# Patient Record
Sex: MALE | Race: WHITE | Employment: UNEMPLOYED | URBAN - METROPOLITAN AREA
[De-identification: names, ages, dates, MRNs, and addresses within clinical notes are randomized per-mention and may not be internally consistent; named-entity substitution may affect disease eponyms.]

---

## 2018-04-12 ENCOUNTER — HOSPITAL ENCOUNTER (OUTPATIENT)
Age: 48
Discharge: HOME | End: 2018-04-12
Payer: MEDICARE

## 2018-04-12 LAB
ALBUMIN SERPL-MCNC: 4.3 G/DL (ref 3.5–5.2)
ALP BLD-CCNC: 56 U/L (ref 40–129)
ALT SERPL-CCNC: 29 U/L (ref 0–40)
ANION GAP SERPL CALCULATED.3IONS-SCNC: 13 MMOL/L (ref 7–16)
AST SERPL-CCNC: 25 U/L (ref 0–39)
BACTERIA: ABNORMAL /HPF
BILIRUB SERPL-MCNC: 0.9 MG/DL (ref 0–1.2)
BILIRUBIN URINE: NEGATIVE
BLOOD, URINE: ABNORMAL
BUN BLDV-MCNC: 13 MG/DL (ref 6–20)
CALCIUM SERPL-MCNC: 9.4 MG/DL (ref 8.6–10.2)
CHLORIDE BLD-SCNC: 102 MMOL/L (ref 98–107)
CHOLESTEROL, TOTAL: 197 MG/DL (ref 0–199)
CLARITY: CLEAR
CO2: 25 MMOL/L (ref 22–29)
COLOR: YELLOW
CREAT SERPL-MCNC: 0.9 MG/DL (ref 0.7–1.2)
GFR AFRICAN AMERICAN: >60
GFR NON-AFRICAN AMERICAN: >60 ML/MIN/1.73
GLUCOSE BLD-MCNC: 106 MG/DL (ref 74–109)
GLUCOSE URINE: NEGATIVE MG/DL
HDLC SERPL-MCNC: 55 MG/DL
KETONES, URINE: NEGATIVE MG/DL
LDL CHOLESTEROL CALCULATED: 122 MG/DL (ref 0–99)
LEUKOCYTE ESTERASE, URINE: NEGATIVE
NITRITE, URINE: NEGATIVE
PH UA: 6 (ref 5–9)
POTASSIUM SERPL-SCNC: 3.9 MMOL/L (ref 3.5–5)
PROTEIN UA: NEGATIVE MG/DL
RBC UA: ABNORMAL /HPF (ref 0–2)
SODIUM BLD-SCNC: 140 MMOL/L (ref 132–146)
SPECIFIC GRAVITY UA: 1.02 (ref 1–1.03)
TOTAL PROTEIN: 7.7 G/DL (ref 6.4–8.3)
TRIGL SERPL-MCNC: 99 MG/DL (ref 0–149)
TSH SERPL DL<=0.05 MIU/L-ACNC: 2.06 UIU/ML (ref 0.27–4.2)
URIC ACID, SERUM: 6.4 MG/DL (ref 3.4–7)
UROBILINOGEN, URINE: 0.2 E.U./DL
VITAMIN D 25-HYDROXY: 31 NG/ML (ref 30–100)
VLDLC SERPL CALC-MCNC: 20 MG/DL
WBC UA: ABNORMAL /HPF (ref 0–5)

## 2018-04-12 PROCEDURE — 81001 URINALYSIS AUTO W/SCOPE: CPT

## 2018-04-12 PROCEDURE — 84443 ASSAY THYROID STIM HORMONE: CPT

## 2018-04-12 PROCEDURE — 82306 VITAMIN D 25 HYDROXY: CPT

## 2018-04-12 PROCEDURE — 80053 COMPREHEN METABOLIC PANEL: CPT

## 2018-04-12 PROCEDURE — 84550 ASSAY OF BLOOD/URIC ACID: CPT

## 2018-04-12 PROCEDURE — 36415 COLL VENOUS BLD VENIPUNCTURE: CPT

## 2018-04-12 PROCEDURE — 80061 LIPID PANEL: CPT

## 2018-04-22 PROBLEM — E78.3 HYPERCHYLOMICRONEMIA: Status: ACTIVE | Noted: 2018-04-22

## 2018-04-22 PROBLEM — R39.89 ABNORMAL PROSTATE EXAM: Status: ACTIVE | Noted: 2018-04-22

## 2018-04-22 PROBLEM — R53.83 FATIGUE: Status: ACTIVE | Noted: 2018-04-22

## 2018-04-22 PROBLEM — E03.9 ACQUIRED HYPOTHYROIDISM: Status: ACTIVE | Noted: 2018-04-22

## 2018-04-22 PROBLEM — M89.9 DISORDER OF BONE: Status: ACTIVE | Noted: 2018-04-22

## 2018-04-24 ENCOUNTER — OFFICE VISIT (OUTPATIENT)
Dept: PRIMARY CARE CLINIC | Age: 48
End: 2018-04-24
Payer: MEDICARE

## 2018-04-24 VITALS
HEART RATE: 63 BPM | HEIGHT: 69 IN | WEIGHT: 221 LBS | TEMPERATURE: 98.2 F | BODY MASS INDEX: 32.73 KG/M2 | SYSTOLIC BLOOD PRESSURE: 120 MMHG | DIASTOLIC BLOOD PRESSURE: 82 MMHG | OXYGEN SATURATION: 97 % | RESPIRATION RATE: 18 BRPM

## 2018-04-24 DIAGNOSIS — N42.89 PROSTATE ASYMMETRY: ICD-10-CM

## 2018-04-24 DIAGNOSIS — K21.00 REFLUX ESOPHAGITIS: ICD-10-CM

## 2018-04-24 DIAGNOSIS — E78.3 HYPERCHYLOMICRONEMIA: ICD-10-CM

## 2018-04-24 DIAGNOSIS — E78.2 MIXED HYPERLIPIDEMIA: ICD-10-CM

## 2018-04-24 DIAGNOSIS — L40.9 PSORIASIS: ICD-10-CM

## 2018-04-24 DIAGNOSIS — R73.09 ELEVATED HEMOGLOBIN A1C: ICD-10-CM

## 2018-04-24 DIAGNOSIS — I89.0 LYMPHEDEMA OF RIGHT LOWER EXTREMITY: ICD-10-CM

## 2018-04-24 DIAGNOSIS — E03.9 ACQUIRED HYPOTHYROIDISM: Primary | ICD-10-CM

## 2018-04-24 DIAGNOSIS — R53.83 FATIGUE, UNSPECIFIED TYPE: ICD-10-CM

## 2018-04-24 PROCEDURE — 99213 OFFICE O/P EST LOW 20 MIN: CPT | Performed by: INTERNAL MEDICINE

## 2018-04-24 RX ORDER — ATORVASTATIN CALCIUM 20 MG/1
20 TABLET, FILM COATED ORAL DAILY
COMMUNITY
End: 2018-04-24 | Stop reason: SDUPTHER

## 2018-04-24 RX ORDER — ATORVASTATIN CALCIUM 20 MG/1
20 TABLET, FILM COATED ORAL DAILY
Qty: 30 TABLET | Refills: 5 | Status: SHIPPED | OUTPATIENT
Start: 2018-04-24

## 2018-04-24 RX ORDER — LEVOTHYROXINE SODIUM 0.03 MG/1
25 TABLET ORAL DAILY
Qty: 30 TABLET | Refills: 5 | Status: SHIPPED | OUTPATIENT
Start: 2018-04-24

## 2018-04-24 RX ORDER — ACETAMINOPHEN 160 MG
1 TABLET,DISINTEGRATING ORAL
COMMUNITY

## 2018-04-24 ASSESSMENT — PATIENT HEALTH QUESTIONNAIRE - PHQ9
1. LITTLE INTEREST OR PLEASURE IN DOING THINGS: 0
2. FEELING DOWN, DEPRESSED OR HOPELESS: 0
SUM OF ALL RESPONSES TO PHQ QUESTIONS 1-9: 0
SUM OF ALL RESPONSES TO PHQ9 QUESTIONS 1 & 2: 0

## 2018-04-24 ASSESSMENT — ENCOUNTER SYMPTOMS
DIARRHEA: 0
EYE REDNESS: 0
BLOOD IN STOOL: 0
EYE PAIN: 0
BLURRED VISION: 0
SHORTNESS OF BREATH: 0
NAUSEA: 0
HEMOPTYSIS: 0
STRIDOR: 0
DOUBLE VISION: 0

## 2018-08-10 ENCOUNTER — HOSPITAL ENCOUNTER (EMERGENCY)
Age: 48
Discharge: HOME | End: 2018-08-10
Attending: EMERGENCY MEDICINE
Payer: MEDICARE

## 2018-08-10 ENCOUNTER — APPOINTMENT (OUTPATIENT)
Dept: GENERAL RADIOLOGY | Age: 48
End: 2018-08-10
Payer: MEDICARE

## 2018-08-10 ENCOUNTER — TELEPHONE (OUTPATIENT)
Dept: ORTHOPEDIC SURGERY | Age: 48
End: 2018-08-10

## 2018-08-10 VITALS
SYSTOLIC BLOOD PRESSURE: 169 MMHG | OXYGEN SATURATION: 98 % | RESPIRATION RATE: 18 BRPM | TEMPERATURE: 98.2 F | BODY MASS INDEX: 33.33 KG/M2 | WEIGHT: 225 LBS | HEART RATE: 78 BPM | HEIGHT: 69 IN | DIASTOLIC BLOOD PRESSURE: 91 MMHG

## 2018-08-10 DIAGNOSIS — S76.109A INJURY OF QUADRICEPS TENDON: Primary | ICD-10-CM

## 2018-08-10 PROCEDURE — 73564 X-RAY EXAM KNEE 4 OR MORE: CPT

## 2018-08-10 PROCEDURE — 99283 EMERGENCY DEPT VISIT LOW MDM: CPT

## 2018-08-10 RX ORDER — OXYCODONE HYDROCHLORIDE AND ACETAMINOPHEN 5; 325 MG/1; MG/1
1-2 TABLET ORAL EVERY 6 HOURS PRN
Qty: 20 TABLET | Refills: 0 | Status: ON HOLD | OUTPATIENT
Start: 2018-08-10 | End: 2018-08-17 | Stop reason: HOSPADM

## 2018-08-10 ASSESSMENT — PAIN DESCRIPTION - DESCRIPTORS: DESCRIPTORS: SORE

## 2018-08-10 ASSESSMENT — PAIN SCALES - GENERAL: PAINLEVEL_OUTOF10: 5

## 2018-08-10 ASSESSMENT — PAIN DESCRIPTION - LOCATION: LOCATION: LEG;KNEE

## 2018-08-10 ASSESSMENT — PAIN DESCRIPTION - PAIN TYPE: TYPE: ACUTE PAIN

## 2018-08-10 ASSESSMENT — PAIN DESCRIPTION - ORIENTATION: ORIENTATION: LEFT
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
## 2018-08-10 NOTE — ED NOTES
Bed: 08  Expected date:   Expected time:   Means of arrival:   Comments:  ems     Anika Hedrick RN  08/10/18 4821

## 2018-08-13 ENCOUNTER — OFFICE VISIT (OUTPATIENT)
Dept: ORTHOPEDIC SURGERY | Age: 48
End: 2018-08-13
Payer: MEDICARE

## 2018-08-13 VITALS
HEART RATE: 86 BPM | BODY MASS INDEX: 32.58 KG/M2 | DIASTOLIC BLOOD PRESSURE: 89 MMHG | SYSTOLIC BLOOD PRESSURE: 139 MMHG | RESPIRATION RATE: 17 BRPM | WEIGHT: 220 LBS | HEIGHT: 69 IN | TEMPERATURE: 97.4 F

## 2018-08-13 DIAGNOSIS — S76.112D RUPTURE OF LEFT QUADRICEPS TENDON, SUBSEQUENT ENCOUNTER: Primary | ICD-10-CM

## 2018-08-13 PROCEDURE — 99203 OFFICE O/P NEW LOW 30 MIN: CPT | Performed by: PHYSICIAN ASSISTANT

## 2018-08-13 PROCEDURE — 99024 POSTOP FOLLOW-UP VISIT: CPT | Performed by: PHYSICIAN ASSISTANT

## 2018-08-14 ENCOUNTER — PREP FOR PROCEDURE (OUTPATIENT)
Dept: ORTHOPEDIC SURGERY | Age: 48
End: 2018-08-14

## 2018-08-14 RX ORDER — SODIUM CHLORIDE, SODIUM LACTATE, POTASSIUM CHLORIDE, CALCIUM CHLORIDE 600; 310; 30; 20 MG/100ML; MG/100ML; MG/100ML; MG/100ML
INJECTION, SOLUTION INTRAVENOUS CONTINUOUS
Status: CANCELLED | OUTPATIENT
Start: 2018-08-14 | End: 2019-08-14

## 2018-08-14 RX ORDER — SODIUM CHLORIDE 0.9 % (FLUSH) 0.9 %
10 SYRINGE (ML) INJECTION PRN
Status: CANCELLED | OUTPATIENT
Start: 2018-08-14 | End: 2019-08-14

## 2018-08-14 RX ORDER — SODIUM CHLORIDE 0.9 % (FLUSH) 0.9 %
10 SYRINGE (ML) INJECTION EVERY 12 HOURS SCHEDULED
Status: CANCELLED | OUTPATIENT
Start: 2018-08-14 | End: 2019-08-14

## 2018-08-14 RX ORDER — ASPIRIN/CALCIUM/MAG/ALUMINUM 325 MG
325 TABLET ORAL DAILY
Status: ON HOLD | COMMUNITY
End: 2018-08-17 | Stop reason: HOSPADM
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
## 2018-08-14 NOTE — PROGRESS NOTES
congestion, sinus pressure, sneezing, sore throat. Negative for headache. Eyes: Negative for visual disturbance, blurred and double vision. Negative for pain, discharge, redness and itching  Respiratory: Negative for cough, shortness of breath and wheezing. Cardiovascular: Negative for chest pain, palpitations and leg swelling. No dyspnea on exertion   Gastrointestinal:   Negative for nausea, vomiting, abdominal pain, diarrhea, constipation  or black or bloody. Hematologic\Lymphatic:  negative for bleeding, petechiae,   Genitourinary: Negative for hematuria and difficulty urinating. Musculoskeletal: Negative for neck pain and stiffness. Negative for back pain, joint swelling and gait problem. Skin: Negative for pallor, rash and wound. Neurological: Negative for dizziness, tremors, seizures, weakness, light-headedness, no TIA or stroke symptoms. No numbness and headaches. Psychiatric/Behavioral: Negative.      Physical Examination:   General appearance: alert, well appearing, and in no distress,  normal appearing weight  Mental status: alert, oriented to person, place, and time, normal mood, behavior, speech, dress, motor activity, and thought processes  Abdomen: soft, nondistended, bowels sounds noted  Resp:   resp easy and unlabored, no audible wheezes note, CTA  Cardiac: distal pulses palpable, skin well perfused, RRR  Neurological: alert, oriented X3, normal speech, no focal findings or movement disorder noted, motor and sensory grossly normal bilaterally, normal muscle tone, no tremors, strength 5/5, normal gait and station, CN II-VII intact  HEENT: normochephalic atraumatic, external ears and eyes normal, sclera normal, neck supple  Extremities:   peripheral pulses normal, no edema, redness or tenderness in the calves   Skin: normal coloration, no rashes or open wounds, no suspicious skin lesions noted  Psych: Affect euthymic   Musculoskeletal:   Left lower extremity:  Skin over the left knee is in the knee immobilizer until the time surgery. The patient had several questions during his office visit which were all answered to his satisfaction. A discussion will be had with the patient by Dr. Kole Diaz prior to surgery regarding risks and benefits. Risk, benefits and treatment options discussed with Gio Rodriguez. He has verbalized understanding of options. The possibility of complications were also discussed to include but not limited to nerve damage, infection, problems with wound healing, vascular injury, chronic pain, stiffness, dysfunction,symptomatic hardware and/or its failure, need for subsequent surgery, and blood clots as well as medical related problems and other problems not specifically discussed. Risk of anesthesia also discussed to include death. Post-op care, work, activity and restrictions which included the use of pain medication and possibility of using blood thinner post op were also discussed with Gio Rodriguez and he verbalized and agreed with the restrictions.      Electronically signed by Tao Lee PA-C on 8/14/18 at 8:46 AM

## 2018-08-15 ENCOUNTER — ANESTHESIA (OUTPATIENT)
Dept: OPERATING ROOM | Age: 48
End: 2018-08-15
Payer: MEDICARE

## 2018-08-15 ENCOUNTER — HOSPITAL ENCOUNTER (OUTPATIENT)
Age: 48
Setting detail: OBSERVATION
Discharge: ANOTHER HOSPITAL | End: 2018-08-17
Attending: ORTHOPAEDIC SURGERY | Admitting: ORTHOPAEDIC SURGERY
Payer: MEDICARE

## 2018-08-15 ENCOUNTER — ANESTHESIA EVENT (OUTPATIENT)
Dept: OPERATING ROOM | Age: 48
End: 2018-08-15
Payer: MEDICARE

## 2018-08-15 VITALS
DIASTOLIC BLOOD PRESSURE: 101 MMHG | RESPIRATION RATE: 2 BRPM | TEMPERATURE: 97.9 F | OXYGEN SATURATION: 97 % | SYSTOLIC BLOOD PRESSURE: 189 MMHG

## 2018-08-15 DIAGNOSIS — Z98.890 HISTORY OF SURGERY: Primary | ICD-10-CM

## 2018-08-15 PROCEDURE — 2500000003 HC RX 250 WO HCPCS: Performed by: NURSE ANESTHETIST, CERTIFIED REGISTERED

## 2018-08-15 PROCEDURE — G0378 HOSPITAL OBSERVATION PER HR: HCPCS

## 2018-08-15 PROCEDURE — 2580000003 HC RX 258: Performed by: PHYSICIAN ASSISTANT

## 2018-08-15 PROCEDURE — 6360000002 HC RX W HCPCS: Performed by: ANESTHESIOLOGY

## 2018-08-15 PROCEDURE — 6370000000 HC RX 637 (ALT 250 FOR IP): Performed by: STUDENT IN AN ORGANIZED HEALTH CARE EDUCATION/TRAINING PROGRAM

## 2018-08-15 PROCEDURE — 7100000001 HC PACU RECOVERY - ADDTL 15 MIN: Performed by: ORTHOPAEDIC SURGERY

## 2018-08-15 PROCEDURE — 6360000002 HC RX W HCPCS: Performed by: NURSE ANESTHETIST, CERTIFIED REGISTERED

## 2018-08-15 PROCEDURE — 27385 REPAIR OF THIGH MUSCLE: CPT | Performed by: ORTHOPAEDIC SURGERY

## 2018-08-15 PROCEDURE — 7100000000 HC PACU RECOVERY - FIRST 15 MIN: Performed by: ORTHOPAEDIC SURGERY

## 2018-08-15 PROCEDURE — 6360000002 HC RX W HCPCS: Performed by: STUDENT IN AN ORGANIZED HEALTH CARE EDUCATION/TRAINING PROGRAM

## 2018-08-15 PROCEDURE — 2580000003 HC RX 258: Performed by: STUDENT IN AN ORGANIZED HEALTH CARE EDUCATION/TRAINING PROGRAM

## 2018-08-15 PROCEDURE — 3700000001 HC ADD 15 MINUTES (ANESTHESIA): Performed by: ORTHOPAEDIC SURGERY

## 2018-08-15 PROCEDURE — 3700000000 HC ANESTHESIA ATTENDED CARE: Performed by: ORTHOPAEDIC SURGERY

## 2018-08-15 PROCEDURE — 3600000014 HC SURGERY LEVEL 4 ADDTL 15MIN: Performed by: ORTHOPAEDIC SURGERY

## 2018-08-15 PROCEDURE — 3600000004 HC SURGERY LEVEL 4 BASE: Performed by: ORTHOPAEDIC SURGERY

## 2018-08-15 PROCEDURE — 6360000002 HC RX W HCPCS: Performed by: PHYSICIAN ASSISTANT

## 2018-08-15 PROCEDURE — 2709999900 HC NON-CHARGEABLE SUPPLY: Performed by: ORTHOPAEDIC SURGERY

## 2018-08-15 RX ORDER — GLYCOPYRROLATE 1 MG/5 ML
SYRINGE (ML) INTRAVENOUS PRN
Status: DISCONTINUED | OUTPATIENT
Start: 2018-08-15 | End: 2018-08-15 | Stop reason: SDUPTHER

## 2018-08-15 RX ORDER — SODIUM CHLORIDE 0.9 % (FLUSH) 0.9 %
10 SYRINGE (ML) INJECTION EVERY 12 HOURS SCHEDULED
Status: DISCONTINUED | OUTPATIENT
Start: 2018-08-15 | End: 2018-08-15 | Stop reason: HOSPADM

## 2018-08-15 RX ORDER — MORPHINE SULFATE 4 MG/ML
4 INJECTION, SOLUTION INTRAMUSCULAR; INTRAVENOUS
Status: DISCONTINUED | OUTPATIENT
Start: 2018-08-15 | End: 2018-08-17

## 2018-08-15 RX ORDER — LEVOTHYROXINE SODIUM 0.03 MG/1
25 TABLET ORAL DAILY
Status: DISCONTINUED | OUTPATIENT
Start: 2018-08-15 | End: 2018-08-17 | Stop reason: HOSPADM

## 2018-08-15 RX ORDER — MORPHINE SULFATE 2 MG/ML
2 INJECTION, SOLUTION INTRAMUSCULAR; INTRAVENOUS
Status: DISCONTINUED | OUTPATIENT
Start: 2018-08-15 | End: 2018-08-17

## 2018-08-15 RX ORDER — SODIUM CHLORIDE 0.9 % (FLUSH) 0.9 %
10 SYRINGE (ML) INJECTION PRN
Status: DISCONTINUED | OUTPATIENT
Start: 2018-08-15 | End: 2018-08-15 | Stop reason: HOSPADM

## 2018-08-15 RX ORDER — ONDANSETRON 2 MG/ML
INJECTION INTRAMUSCULAR; INTRAVENOUS PRN
Status: DISCONTINUED | OUTPATIENT
Start: 2018-08-15 | End: 2018-08-15 | Stop reason: SDUPTHER

## 2018-08-15 RX ORDER — NEOSTIGMINE METHYLSULFATE 1 MG/ML
INJECTION, SOLUTION INTRAVENOUS PRN
Status: DISCONTINUED | OUTPATIENT
Start: 2018-08-15 | End: 2018-08-15 | Stop reason: SDUPTHER

## 2018-08-15 RX ORDER — MIDAZOLAM HYDROCHLORIDE 1 MG/ML
INJECTION INTRAMUSCULAR; INTRAVENOUS PRN
Status: DISCONTINUED | OUTPATIENT
Start: 2018-08-15 | End: 2018-08-15 | Stop reason: SDUPTHER

## 2018-08-15 RX ORDER — MIDAZOLAM HYDROCHLORIDE 1 MG/ML
2 INJECTION INTRAMUSCULAR; INTRAVENOUS ONCE
Status: COMPLETED | OUTPATIENT
Start: 2018-08-15 | End: 2018-08-15

## 2018-08-15 RX ORDER — SODIUM CHLORIDE 0.9 % (FLUSH) 0.9 %
10 SYRINGE (ML) INJECTION PRN
Status: DISCONTINUED | OUTPATIENT
Start: 2018-08-15 | End: 2018-08-17 | Stop reason: HOSPADM

## 2018-08-15 RX ORDER — ROCURONIUM BROMIDE 10 MG/ML
INJECTION, SOLUTION INTRAVENOUS PRN
Status: DISCONTINUED | OUTPATIENT
Start: 2018-08-15 | End: 2018-08-15 | Stop reason: SDUPTHER

## 2018-08-15 RX ORDER — HYDROCODONE BITARTRATE AND ACETAMINOPHEN 5; 325 MG/1; MG/1
2 TABLET ORAL EVERY 4 HOURS PRN
Status: DISCONTINUED | OUTPATIENT
Start: 2018-08-15 | End: 2018-08-17 | Stop reason: HOSPADM

## 2018-08-15 RX ORDER — HYDROCODONE BITARTRATE AND ACETAMINOPHEN 5; 325 MG/1; MG/1
1 TABLET ORAL EVERY 4 HOURS PRN
Qty: 40 TABLET | Refills: 0 | Status: SHIPPED | OUTPATIENT
Start: 2018-08-15 | End: 2018-08-22

## 2018-08-15 RX ORDER — ATORVASTATIN CALCIUM 20 MG/1
20 TABLET, FILM COATED ORAL DAILY
Status: DISCONTINUED | OUTPATIENT
Start: 2018-08-15 | End: 2018-08-17 | Stop reason: HOSPADM

## 2018-08-15 RX ORDER — ASPIRIN 325 MG
325 TABLET, DELAYED RELEASE (ENTERIC COATED) ORAL 2 TIMES DAILY
Qty: 56 TABLET | Refills: 0 | Status: SHIPPED | OUTPATIENT
Start: 2018-08-15 | End: 2018-08-17 | Stop reason: HOSPADM

## 2018-08-15 RX ORDER — SODIUM CHLORIDE 0.9 % (FLUSH) 0.9 %
10 SYRINGE (ML) INJECTION EVERY 12 HOURS SCHEDULED
Status: DISCONTINUED | OUTPATIENT
Start: 2018-08-15 | End: 2018-08-17 | Stop reason: HOSPADM

## 2018-08-15 RX ORDER — PROPOFOL 10 MG/ML
INJECTION, EMULSION INTRAVENOUS PRN
Status: DISCONTINUED | OUTPATIENT
Start: 2018-08-15 | End: 2018-08-15 | Stop reason: SDUPTHER

## 2018-08-15 RX ORDER — SODIUM CHLORIDE, SODIUM LACTATE, POTASSIUM CHLORIDE, CALCIUM CHLORIDE 600; 310; 30; 20 MG/100ML; MG/100ML; MG/100ML; MG/100ML
INJECTION, SOLUTION INTRAVENOUS CONTINUOUS
Status: DISCONTINUED | OUTPATIENT
Start: 2018-08-15 | End: 2018-08-15

## 2018-08-15 RX ORDER — FENTANYL CITRATE 50 UG/ML
INJECTION, SOLUTION INTRAMUSCULAR; INTRAVENOUS PRN
Status: DISCONTINUED | OUTPATIENT
Start: 2018-08-15 | End: 2018-08-15 | Stop reason: SDUPTHER

## 2018-08-15 RX ORDER — DEXAMETHASONE SODIUM PHOSPHATE 10 MG/ML
INJECTION, SOLUTION INTRAMUSCULAR; INTRAVENOUS PRN
Status: DISCONTINUED | OUTPATIENT
Start: 2018-08-15 | End: 2018-08-15 | Stop reason: SDUPTHER

## 2018-08-15 RX ORDER — ONDANSETRON 2 MG/ML
4 INJECTION INTRAMUSCULAR; INTRAVENOUS EVERY 6 HOURS PRN
Status: DISCONTINUED | OUTPATIENT
Start: 2018-08-15 | End: 2018-08-17 | Stop reason: HOSPADM

## 2018-08-15 RX ORDER — ACETAMINOPHEN 325 MG/1
650 TABLET ORAL EVERY 4 HOURS PRN
Status: DISCONTINUED | OUTPATIENT
Start: 2018-08-15 | End: 2018-08-17 | Stop reason: HOSPADM

## 2018-08-15 RX ORDER — DOCUSATE SODIUM 100 MG/1
100 CAPSULE, LIQUID FILLED ORAL 2 TIMES DAILY
Status: DISCONTINUED | OUTPATIENT
Start: 2018-08-15 | End: 2018-08-17 | Stop reason: HOSPADM

## 2018-08-15 RX ORDER — HYDROCODONE BITARTRATE AND ACETAMINOPHEN 5; 325 MG/1; MG/1
1 TABLET ORAL EVERY 4 HOURS PRN
Status: DISCONTINUED | OUTPATIENT
Start: 2018-08-15 | End: 2018-08-17 | Stop reason: HOSPADM

## 2018-08-15 RX ORDER — CHOLECALCIFEROL (VITAMIN D3) 50 MCG
2000 TABLET ORAL DAILY
Status: DISCONTINUED | OUTPATIENT
Start: 2018-08-15 | End: 2018-08-17 | Stop reason: HOSPADM

## 2018-08-15 RX ORDER — LIDOCAINE HYDROCHLORIDE 20 MG/ML
INJECTION, SOLUTION EPIDURAL; INFILTRATION; INTRACAUDAL; PERINEURAL PRN
Status: DISCONTINUED | OUTPATIENT
Start: 2018-08-15 | End: 2018-08-15 | Stop reason: SDUPTHER

## 2018-08-15 RX ORDER — LABETALOL HYDROCHLORIDE 5 MG/ML
INJECTION, SOLUTION INTRAVENOUS PRN
Status: DISCONTINUED | OUTPATIENT
Start: 2018-08-15 | End: 2018-08-15 | Stop reason: SDUPTHER

## 2018-08-15 RX ADMIN — Medication 10 ML: at 22:00

## 2018-08-15 RX ADMIN — MIDAZOLAM HYDROCHLORIDE 2 MG: 1 INJECTION, SOLUTION INTRAMUSCULAR; INTRAVENOUS at 11:08

## 2018-08-15 RX ADMIN — LIDOCAINE HYDROCHLORIDE 10 MG: 20 INJECTION, SOLUTION EPIDURAL; INFILTRATION; INTRACAUDAL; PERINEURAL at 11:44

## 2018-08-15 RX ADMIN — FENTANYL CITRATE 50 MCG: 50 INJECTION, SOLUTION INTRAMUSCULAR; INTRAVENOUS at 12:18

## 2018-08-15 RX ADMIN — MORPHINE SULFATE 4 MG: 4 INJECTION, SOLUTION INTRAMUSCULAR; INTRAVENOUS at 18:06

## 2018-08-15 RX ADMIN — DEXAMETHASONE SODIUM PHOSPHATE 10 MG: 10 INJECTION, SOLUTION INTRAMUSCULAR; INTRAVENOUS at 11:25

## 2018-08-15 RX ADMIN — MORPHINE SULFATE 4 MG: 4 INJECTION, SOLUTION INTRAMUSCULAR; INTRAVENOUS at 15:55

## 2018-08-15 RX ADMIN — ONDANSETRON HYDROCHLORIDE 4 MG: 2 INJECTION, SOLUTION INTRAMUSCULAR; INTRAVENOUS at 12:03

## 2018-08-15 RX ADMIN — ROCURONIUM BROMIDE 40 MG: 10 INJECTION INTRAVENOUS at 11:14

## 2018-08-15 RX ADMIN — FENTANYL CITRATE 50 MCG: 50 INJECTION, SOLUTION INTRAMUSCULAR; INTRAVENOUS at 12:09

## 2018-08-15 RX ADMIN — HYDROCODONE BITARTRATE AND ACETAMINOPHEN 2 TABLET: 5; 325 TABLET ORAL at 14:44

## 2018-08-15 RX ADMIN — FENTANYL CITRATE 50 MCG: 50 INJECTION, SOLUTION INTRAMUSCULAR; INTRAVENOUS at 11:30

## 2018-08-15 RX ADMIN — Medication 2 G: at 11:08

## 2018-08-15 RX ADMIN — Medication 2000 UNITS: at 17:05

## 2018-08-15 RX ADMIN — MIDAZOLAM HYDROCHLORIDE 2 MG: 1 INJECTION, SOLUTION INTRAMUSCULAR; INTRAVENOUS at 09:15

## 2018-08-15 RX ADMIN — HYDROMORPHONE HYDROCHLORIDE 0.5 MG: 1 INJECTION, SOLUTION INTRAMUSCULAR; INTRAVENOUS; SUBCUTANEOUS at 13:10

## 2018-08-15 RX ADMIN — Medication 10 ML: at 15:55

## 2018-08-15 RX ADMIN — ONDANSETRON 4 MG: 2 INJECTION, SOLUTION INTRAMUSCULAR; INTRAVENOUS at 15:55

## 2018-08-15 RX ADMIN — MORPHINE SULFATE 4 MG: 4 INJECTION, SOLUTION INTRAMUSCULAR; INTRAVENOUS at 21:08

## 2018-08-15 RX ADMIN — Medication 10 ML: at 19:42

## 2018-08-15 RX ADMIN — Medication 3 MG: at 12:11

## 2018-08-15 RX ADMIN — ASPIRIN 325 MG: 325 TABLET, DELAYED RELEASE ORAL at 17:05

## 2018-08-15 RX ADMIN — PROPOFOL 200 MG: 10 INJECTION, EMULSION INTRAVENOUS at 11:14

## 2018-08-15 RX ADMIN — HYDROMORPHONE HYDROCHLORIDE 0.5 MG: 1 INJECTION, SOLUTION INTRAMUSCULAR; INTRAVENOUS; SUBCUTANEOUS at 13:17

## 2018-08-15 RX ADMIN — LIDOCAINE HYDROCHLORIDE 40 MG: 20 INJECTION, SOLUTION EPIDURAL; INFILTRATION; INTRACAUDAL; PERINEURAL at 11:14

## 2018-08-15 RX ADMIN — SODIUM CHLORIDE, POTASSIUM CHLORIDE, SODIUM LACTATE AND CALCIUM CHLORIDE: 600; 310; 30; 20 INJECTION, SOLUTION INTRAVENOUS at 12:00

## 2018-08-15 RX ADMIN — LEVOTHYROXINE SODIUM 25 MCG: 25 TABLET ORAL at 17:04

## 2018-08-15 RX ADMIN — Medication 2 G: at 21:30

## 2018-08-15 RX ADMIN — HYDROCODONE BITARTRATE AND ACETAMINOPHEN 2 TABLET: 5; 325 TABLET ORAL at 19:18

## 2018-08-15 RX ADMIN — Medication 0.6 MG: at 12:12

## 2018-08-15 RX ADMIN — FENTANYL CITRATE 100 MCG: 50 INJECTION, SOLUTION INTRAMUSCULAR; INTRAVENOUS at 11:14

## 2018-08-15 RX ADMIN — SODIUM CHLORIDE, POTASSIUM CHLORIDE, SODIUM LACTATE AND CALCIUM CHLORIDE: 600; 310; 30; 20 INJECTION, SOLUTION INTRAVENOUS at 08:17

## 2018-08-15 RX ADMIN — LABETALOL HYDROCHLORIDE 5 MG: 5 INJECTION INTRAVENOUS at 12:43

## 2018-08-15 ASSESSMENT — PULMONARY FUNCTION TESTS
PIF_VALUE: 3
PIF_VALUE: 21
PIF_VALUE: 22
PIF_VALUE: 0
PIF_VALUE: 21
PIF_VALUE: 23
PIF_VALUE: 30
PIF_VALUE: 22
PIF_VALUE: 0
PIF_VALUE: 11
PIF_VALUE: 5
PIF_VALUE: 3
PIF_VALUE: 22
PIF_VALUE: 23
PIF_VALUE: 16
PIF_VALUE: 23
PIF_VALUE: 22
PIF_VALUE: 19
PIF_VALUE: 23
PIF_VALUE: 0
PIF_VALUE: 23
PIF_VALUE: 4
PIF_VALUE: 13
PIF_VALUE: 7
PIF_VALUE: 23
PIF_VALUE: 22
PIF_VALUE: 1
PIF_VALUE: 7
PIF_VALUE: 25
PIF_VALUE: 22
PIF_VALUE: 24
PIF_VALUE: 7
PIF_VALUE: 22
PIF_VALUE: 23
PIF_VALUE: 21
PIF_VALUE: 6
PIF_VALUE: 23
PIF_VALUE: 11
PIF_VALUE: 8
PIF_VALUE: 16
PIF_VALUE: 3
PIF_VALUE: 22
PIF_VALUE: 0
PIF_VALUE: 23
PIF_VALUE: 5
PIF_VALUE: 22
PIF_VALUE: 22
PIF_VALUE: 1
PIF_VALUE: 22
PIF_VALUE: 24
PIF_VALUE: 24
PIF_VALUE: 12
PIF_VALUE: 23
PIF_VALUE: 5
PIF_VALUE: 6
PIF_VALUE: 15
PIF_VALUE: 41
PIF_VALUE: 23
PIF_VALUE: 21
PIF_VALUE: 22
PIF_VALUE: 23
PIF_VALUE: 21
PIF_VALUE: 1
PIF_VALUE: 21
PIF_VALUE: 6
PIF_VALUE: 15
PIF_VALUE: 40
PIF_VALUE: 22
PIF_VALUE: 0
PIF_VALUE: 22
PIF_VALUE: 3
PIF_VALUE: 22
PIF_VALUE: 1
PIF_VALUE: 4
PIF_VALUE: 20
PIF_VALUE: 1
PIF_VALUE: 36
PIF_VALUE: 23
PIF_VALUE: 22
PIF_VALUE: 22
PIF_VALUE: 24
PIF_VALUE: 31
PIF_VALUE: 21
PIF_VALUE: 22
PIF_VALUE: 8
PIF_VALUE: 14
PIF_VALUE: 21
PIF_VALUE: 2
PIF_VALUE: 23
PIF_VALUE: 24
PIF_VALUE: 22
PIF_VALUE: 23
PIF_VALUE: 24
PIF_VALUE: 21
PIF_VALUE: 1
PIF_VALUE: 23
PIF_VALUE: 22
PIF_VALUE: 21
PIF_VALUE: 24
PIF_VALUE: 33

## 2018-08-15 ASSESSMENT — PAIN SCALES - GENERAL
PAINLEVEL_OUTOF10: 5
PAINLEVEL_OUTOF10: 3
PAINLEVEL_OUTOF10: 8
PAINLEVEL_OUTOF10: 7
PAINLEVEL_OUTOF10: 0
PAINLEVEL_OUTOF10: 8
PAINLEVEL_OUTOF10: 8
PAINLEVEL_OUTOF10: 10
PAINLEVEL_OUTOF10: 7
PAINLEVEL_OUTOF10: 8
PAINLEVEL_OUTOF10: 6
PAINLEVEL_OUTOF10: 0
PAINLEVEL_OUTOF10: 7

## 2018-08-15 ASSESSMENT — PAIN DESCRIPTION - ONSET
ONSET: ON-GOING

## 2018-08-15 ASSESSMENT — PAIN DESCRIPTION - DESCRIPTORS
DESCRIPTORS: ACHING;DISCOMFORT;SORE
DESCRIPTORS: ACHING;DISCOMFORT
DESCRIPTORS: ACHING
DESCRIPTORS: ACHING;BURNING;CONSTANT

## 2018-08-15 ASSESSMENT — PAIN - FUNCTIONAL ASSESSMENT: PAIN_FUNCTIONAL_ASSESSMENT: 0-10

## 2018-08-15 ASSESSMENT — PAIN DESCRIPTION - PAIN TYPE
TYPE: SURGICAL PAIN

## 2018-08-15 ASSESSMENT — PAIN DESCRIPTION - FREQUENCY
FREQUENCY: CONTINUOUS

## 2018-08-15 ASSESSMENT — PAIN DESCRIPTION - ORIENTATION
ORIENTATION: LEFT

## 2018-08-15 ASSESSMENT — PAIN DESCRIPTION - LOCATION
LOCATION: KNEE

## 2018-08-15 ASSESSMENT — PAIN DESCRIPTION - PROGRESSION
CLINICAL_PROGRESSION: GRADUALLY IMPROVING
CLINICAL_PROGRESSION: GRADUALLY WORSENING
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
## 2018-08-15 NOTE — ANESTHESIA POSTPROCEDURE EVALUATION
Department of Anesthesiology  Postprocedure Note    Patient: Remy Castillo  MRN: [id number]  YOB: 1970  Date of evaluation: 8/15/2018  Time:  4:39 PM     Procedure Summary     Date:  08/15/18 Room / Location:  [address] OR    Anesthesia Start:  1589 Anesthesia Stop:  4705    Procedure:  LEFT QUAD TENDON REPAIR (Left ) Diagnosis:  (LEFT QUAD TENDON RUPTURE)    Surgeon:  Douglas Cho DO Responsible Provider:  Gerson León DO    Anesthesia Type:  general ASA Status:  3          Anesthesia Type: general    Vaibhav Phase I: Vaibhav Score: 9    Vaibhav Phase II:      Last vitals: Reviewed and per EMR flowsheets.        Anesthesia Post Evaluation    Patient location during evaluation: PACU  Patient participation: complete - patient participated  Level of consciousness: awake and alert  Pain score: 1  Airway patency: patent  Nausea & Vomiting: no nausea and no vomiting  Complications: no  Cardiovascular status: hemodynamically stable  Respiratory status: acceptable  Hydration status: euvolemic

## 2018-08-16 LAB
HCT VFR BLD CALC: 40.7 % (ref 37–54)
HEMOGLOBIN: 14.5 G/DL (ref 12.5–16.5)
MCH RBC QN AUTO: 30.9 PG (ref 26–35)
MCHC RBC AUTO-ENTMCNC: 35.6 % (ref 32–34.5)
MCV RBC AUTO: 86.8 FL (ref 80–99.9)
PDW BLD-RTO: 11.9 FL (ref 11.5–15)
PLATELET # BLD: 292 E9/L (ref 130–450)
PMV BLD AUTO: 10.4 FL (ref 7–12)
RBC # BLD: 4.69 E12/L (ref 3.8–5.8)
WBC # BLD: 14.5 E9/L (ref 4.5–11.5)

## 2018-08-16 PROCEDURE — G8978 MOBILITY CURRENT STATUS: HCPCS

## 2018-08-16 PROCEDURE — 97165 OT EVAL LOW COMPLEX 30 MIN: CPT

## 2018-08-16 PROCEDURE — 2580000003 HC RX 258: Performed by: STUDENT IN AN ORGANIZED HEALTH CARE EDUCATION/TRAINING PROGRAM

## 2018-08-16 PROCEDURE — G8979 MOBILITY GOAL STATUS: HCPCS

## 2018-08-16 PROCEDURE — 6360000002 HC RX W HCPCS: Performed by: STUDENT IN AN ORGANIZED HEALTH CARE EDUCATION/TRAINING PROGRAM

## 2018-08-16 PROCEDURE — 6370000000 HC RX 637 (ALT 250 FOR IP): Performed by: STUDENT IN AN ORGANIZED HEALTH CARE EDUCATION/TRAINING PROGRAM

## 2018-08-16 PROCEDURE — G0378 HOSPITAL OBSERVATION PER HR: HCPCS

## 2018-08-16 PROCEDURE — G8988 SELF CARE GOAL STATUS: HCPCS

## 2018-08-16 PROCEDURE — 36415 COLL VENOUS BLD VENIPUNCTURE: CPT

## 2018-08-16 PROCEDURE — 85027 COMPLETE CBC AUTOMATED: CPT

## 2018-08-16 PROCEDURE — 96374 THER/PROPH/DIAG INJ IV PUSH: CPT

## 2018-08-16 PROCEDURE — 96375 TX/PRO/DX INJ NEW DRUG ADDON: CPT

## 2018-08-16 PROCEDURE — 97161 PT EVAL LOW COMPLEX 20 MIN: CPT

## 2018-08-16 PROCEDURE — G8987 SELF CARE CURRENT STATUS: HCPCS

## 2018-08-16 PROCEDURE — 96376 TX/PRO/DX INJ SAME DRUG ADON: CPT

## 2018-08-16 RX ADMIN — DOCUSATE SODIUM 100 MG: 100 CAPSULE, LIQUID FILLED ORAL at 09:28

## 2018-08-16 RX ADMIN — MORPHINE SULFATE 4 MG: 4 INJECTION, SOLUTION INTRAMUSCULAR; INTRAVENOUS at 19:19

## 2018-08-16 RX ADMIN — MORPHINE SULFATE 4 MG: 4 INJECTION, SOLUTION INTRAMUSCULAR; INTRAVENOUS at 01:22

## 2018-08-16 RX ADMIN — Medication 10 ML: at 12:40

## 2018-08-16 RX ADMIN — MORPHINE SULFATE 4 MG: 4 INJECTION, SOLUTION INTRAMUSCULAR; INTRAVENOUS at 12:39

## 2018-08-16 RX ADMIN — Medication 2 G: at 05:35

## 2018-08-16 RX ADMIN — HYDROCODONE BITARTRATE AND ACETAMINOPHEN 2 TABLET: 5; 325 TABLET ORAL at 21:23

## 2018-08-16 RX ADMIN — HYDROCODONE BITARTRATE AND ACETAMINOPHEN 2 TABLET: 5; 325 TABLET ORAL at 15:46

## 2018-08-16 RX ADMIN — HYDROCODONE BITARTRATE AND ACETAMINOPHEN 2 TABLET: 5; 325 TABLET ORAL at 03:50

## 2018-08-16 RX ADMIN — MORPHINE SULFATE 4 MG: 4 INJECTION, SOLUTION INTRAMUSCULAR; INTRAVENOUS at 23:15

## 2018-08-16 RX ADMIN — Medication 10 ML: at 21:04

## 2018-08-16 RX ADMIN — ATORVASTATIN CALCIUM 20 MG: 20 TABLET, FILM COATED ORAL at 21:04

## 2018-08-16 RX ADMIN — HYDROCODONE BITARTRATE AND ACETAMINOPHEN 2 TABLET: 5; 325 TABLET ORAL at 09:29

## 2018-08-16 RX ADMIN — DOCUSATE SODIUM 100 MG: 100 CAPSULE, LIQUID FILLED ORAL at 21:05

## 2018-08-16 RX ADMIN — ASPIRIN 325 MG: 325 TABLET, DELAYED RELEASE ORAL at 09:30

## 2018-08-16 RX ADMIN — Medication 10 ML: at 09:29

## 2018-08-16 RX ADMIN — LEVOTHYROXINE SODIUM 25 MCG: 25 TABLET ORAL at 06:42

## 2018-08-16 RX ADMIN — MORPHINE SULFATE 4 MG: 4 INJECTION, SOLUTION INTRAMUSCULAR; INTRAVENOUS at 04:50

## 2018-08-16 ASSESSMENT — PAIN SCALES - GENERAL
PAINLEVEL_OUTOF10: 7
PAINLEVEL_OUTOF10: 8
PAINLEVEL_OUTOF10: 8
PAINLEVEL_OUTOF10: 5
PAINLEVEL_OUTOF10: 5
PAINLEVEL_OUTOF10: 8
PAINLEVEL_OUTOF10: 7
PAINLEVEL_OUTOF10: 8
PAINLEVEL_OUTOF10: 5
PAINLEVEL_OUTOF10: 4

## 2018-08-16 ASSESSMENT — PAIN DESCRIPTION - DESCRIPTORS
DESCRIPTORS: DISCOMFORT
DESCRIPTORS: NAGGING
DESCRIPTORS: ACHING;DISCOMFORT
DESCRIPTORS: DISCOMFORT
DESCRIPTORS: NAGGING
DESCRIPTORS: NAGGING;DISCOMFORT

## 2018-08-16 ASSESSMENT — PAIN DESCRIPTION - PAIN TYPE
TYPE: SURGICAL PAIN

## 2018-08-16 ASSESSMENT — PAIN DESCRIPTION - LOCATION
LOCATION: KNEE;LEG
LOCATION: KNEE
LOCATION: KNEE;LEG

## 2018-08-16 ASSESSMENT — PAIN DESCRIPTION - FREQUENCY
FREQUENCY: CONTINUOUS
FREQUENCY: INTERMITTENT
FREQUENCY: CONTINUOUS
FREQUENCY: INTERMITTENT

## 2018-08-16 ASSESSMENT — PAIN DESCRIPTION - ONSET
ONSET: ON-GOING
ONSET: ON-GOING

## 2018-08-16 ASSESSMENT — PAIN DESCRIPTION - ORIENTATION
ORIENTATION: LEFT
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
## 2018-08-16 NOTE — PLAN OF CARE
Problem: Mobility - Impaired:  Goal: Mobility will improve  Mobility will improve  Outcome: Not Met This Shift

## 2018-08-16 NOTE — PLAN OF CARE
Problem: Mobility - Impaired:  Goal: Mobility will improve  Mobility will improve   Outcome: Ongoing

## 2018-08-17 VITALS
TEMPERATURE: 99.6 F | OXYGEN SATURATION: 94 % | HEART RATE: 74 BPM | WEIGHT: 220 LBS | HEIGHT: 69 IN | SYSTOLIC BLOOD PRESSURE: 132 MMHG | DIASTOLIC BLOOD PRESSURE: 70 MMHG | BODY MASS INDEX: 32.58 KG/M2 | RESPIRATION RATE: 18 BRPM

## 2018-08-17 LAB
CREAT SERPL-MCNC: 1 MG/DL (ref 0.7–1.2)
GFR AFRICAN AMERICAN: >60
GFR NON-AFRICAN AMERICAN: >60 ML/MIN/1.73
HCT VFR BLD CALC: 41.6 % (ref 37–54)
HEMOGLOBIN: 14.5 G/DL (ref 12.5–16.5)
MCH RBC QN AUTO: 31 PG (ref 26–35)
MCHC RBC AUTO-ENTMCNC: 34.9 % (ref 32–34.5)
MCV RBC AUTO: 89.1 FL (ref 80–99.9)
PDW BLD-RTO: 12.1 FL (ref 11.5–15)
PLATELET # BLD: 271 E9/L (ref 130–450)
PMV BLD AUTO: 10.4 FL (ref 7–12)
RBC # BLD: 4.67 E12/L (ref 3.8–5.8)
WBC # BLD: 12.3 E9/L (ref 4.5–11.5)

## 2018-08-17 PROCEDURE — 82565 ASSAY OF CREATININE: CPT

## 2018-08-17 PROCEDURE — G0378 HOSPITAL OBSERVATION PER HR: HCPCS

## 2018-08-17 PROCEDURE — 6360000002 HC RX W HCPCS: Performed by: STUDENT IN AN ORGANIZED HEALTH CARE EDUCATION/TRAINING PROGRAM

## 2018-08-17 PROCEDURE — 2580000003 HC RX 258: Performed by: STUDENT IN AN ORGANIZED HEALTH CARE EDUCATION/TRAINING PROGRAM

## 2018-08-17 PROCEDURE — 6360000002 HC RX W HCPCS: Performed by: PHYSICIAN ASSISTANT

## 2018-08-17 PROCEDURE — 81241 F5 GENE: CPT

## 2018-08-17 PROCEDURE — 99024 POSTOP FOLLOW-UP VISIT: CPT | Performed by: PHYSICIAN ASSISTANT

## 2018-08-17 PROCEDURE — 6370000000 HC RX 637 (ALT 250 FOR IP): Performed by: STUDENT IN AN ORGANIZED HEALTH CARE EDUCATION/TRAINING PROGRAM

## 2018-08-17 PROCEDURE — 96372 THER/PROPH/DIAG INJ SC/IM: CPT

## 2018-08-17 PROCEDURE — 97530 THERAPEUTIC ACTIVITIES: CPT

## 2018-08-17 PROCEDURE — 85027 COMPLETE CBC AUTOMATED: CPT

## 2018-08-17 PROCEDURE — 36415 COLL VENOUS BLD VENIPUNCTURE: CPT

## 2018-08-17 PROCEDURE — 96376 TX/PRO/DX INJ SAME DRUG ADON: CPT

## 2018-08-17 RX ADMIN — Medication 10 ML: at 08:05

## 2018-08-17 RX ADMIN — HYDROCODONE BITARTRATE AND ACETAMINOPHEN 2 TABLET: 5; 325 TABLET ORAL at 15:53

## 2018-08-17 RX ADMIN — HYDROCODONE BITARTRATE AND ACETAMINOPHEN 2 TABLET: 5; 325 TABLET ORAL at 05:53

## 2018-08-17 RX ADMIN — MORPHINE SULFATE 4 MG: 4 INJECTION, SOLUTION INTRAMUSCULAR; INTRAVENOUS at 02:39

## 2018-08-17 RX ADMIN — HYDROCODONE BITARTRATE AND ACETAMINOPHEN 2 TABLET: 5; 325 TABLET ORAL at 10:32

## 2018-08-17 RX ADMIN — Medication 2000 UNITS: at 08:05

## 2018-08-17 RX ADMIN — LEVOTHYROXINE SODIUM 25 MCG: 25 TABLET ORAL at 08:05

## 2018-08-17 RX ADMIN — DOCUSATE SODIUM 100 MG: 100 CAPSULE, LIQUID FILLED ORAL at 08:05

## 2018-08-17 RX ADMIN — ENOXAPARIN SODIUM 40 MG: 40 INJECTION SUBCUTANEOUS at 08:05

## 2018-08-17 ASSESSMENT — PAIN SCALES - GENERAL
PAINLEVEL_OUTOF10: 3
PAINLEVEL_OUTOF10: 7
PAINLEVEL_OUTOF10: 0
PAINLEVEL_OUTOF10: 0
PAINLEVEL_OUTOF10: 5
PAINLEVEL_OUTOF10: 7

## 2018-08-17 ASSESSMENT — PAIN DESCRIPTION - ORIENTATION
ORIENTATION: LEFT

## 2018-08-17 ASSESSMENT — PAIN DESCRIPTION - LOCATION
LOCATION: KNEE;LEG
LOCATION: KNEE
LOCATION: KNEE;LEG

## 2018-08-17 ASSESSMENT — PAIN DESCRIPTION - DESCRIPTORS
DESCRIPTORS: ACHING;DISCOMFORT;THROBBING
DESCRIPTORS: ACHING;DISCOMFORT;SORE
DESCRIPTORS: DISCOMFORT

## 2018-08-17 ASSESSMENT — PAIN DESCRIPTION - ONSET
ONSET: ON-GOING

## 2018-08-17 ASSESSMENT — PAIN DESCRIPTION - PROGRESSION: CLINICAL_PROGRESSION: NOT CHANGED

## 2018-08-17 ASSESSMENT — PAIN DESCRIPTION - PAIN TYPE
TYPE: SURGICAL PAIN

## 2018-08-17 ASSESSMENT — PAIN DESCRIPTION - FREQUENCY
FREQUENCY: CONTINUOUS

## 2018-08-17 NOTE — DISCHARGE SUMMARY
Physician Discharge Summary    Patient ID:  Edwin Floyd  [id number]  27 y.o.  1970    Admit date: 8/15/2018    Discharge date and time: No discharge date for patient encounter. Admitting Physician: Genoveva Garcia DO     Discharge Physician: Genoveva Garcia DO    Admission Diagnoses: S/P tendon repair [T17.583]    Discharge Diagnoses: s/p Left quadriceps tendon open repair. HPI: This is a 69-year-old male visually seen and evaluated in the emergency dependent on Friday, 8/10/2018, found to have an acute left quadriceps tendon rupture. We recommended surgical intervention in the form of a left quadriceps tendon open repair. He was understanding of risks, benefits, details, complications of surgery and he wishes to proceed. On 8/15/2018 the patient underwent a open repair of a left quadriceps tendon without complication by Dr. Kendrick Rebecca he was admitted for planned rehab transfer, and followed postoperative following surgery. Hospital Course: The patient was seen postoperative stay wanted to  each day, no new complaints were noted. Family medicine was consult with regarding DVT prophylaxis. The patient was started on pain control, DVT prophylaxis, antibiotics, and physical therapy as indicated following surgery. Blood counts were followed as needed. Discharge planning was performed and tailored in regards to patient progress, therapy progress, home needs, and support. Once the patient had made appropriate gains, they were able to be discharged. The patient was found to be stable for discharge on postoperative day 2 plan for transfer to rehab. Treatments: s/p Left quadriceps tendon open repair. Disposition: The patient was provided instructions to continue pain medication, DVT prophylaxis, dressing changes as applicable, remain in knee immobilizer. The patient was instructed on restrictions and activities.  The patient was to follow up with Genoveva Garcia DO, and

## 2018-08-17 NOTE — CONSULTS
bowels sounds noted  Resp:   resp easy and unlabored, no audible wheezes note, CTA  Cardiac: distal pulses palpable, skin well perfused, RRR  Neurological: alert, oriented X3, normal speech, no focal findings or movement disorder noted, motor and sensory grossly normal bilaterally, normal muscle tone, no tremors, strength 5/5, normal gait and station, CN II-VII intact  HEENT: normochephalic atraumatic, external ears and eyes normal, sclera normal, neck supple  Extremities:   peripheral pulses normal, no edema, redness or tenderness in the calves   Skin: normal coloration, no rashes or open wounds, no suspicious skin lesions noted  Psych: Affect euthymic   Musculoskeletal:   Left lower extremity:  Skin over the left knee is noted be clean dry and intact, ecchymosis noted, moderate swelling is noted improved somewhat since last exam, a moderate knee effusion is noted, he is tender palpation superior pole of patella otherwise nontender throughout the rest of the knee, and has a palpable defect of extensor mechanism, extension of knee is not intact, he has no laxity of the LCL or MCL, sensation is intact to light touch in the sural, deep peroneal, superficial peroneal, saphenous, and posterior tibial nerve distributions the foot and ankle, compartments are soft, he is active plantar dorsiflexion as well as great toe extension, calves are soft nontender.     /89   Pulse 86   Temp 97.4 °F (36.3 °C)   Resp 17   Ht 5' 9\" (1.753 m)   Wt 220 lb (99.8 kg)   BMI 32.49 kg/m²      XR:   X-rays reviewed demonstrating no acute fracture or pathology.      DATA:    CBC:         Lab Results   Component Value Date     WBC 7.2 10/17/2017     RBC 5.41 10/17/2017     HGB 16.2 10/17/2017     HCT 48.1 10/17/2017     MCV 88.9 10/17/2017     MCH 29.9 10/17/2017     MCHC 33.7 10/17/2017     RDW 12.0 10/17/2017      10/17/2017     MPV 10.5 10/17/2017          ASSESSMENT:    Diagnosis Orders   1.  Rupture of left quadriceps tendon, subsequent encounter  External Referral To Home Health         DISCUSSION:     Following the review of his past medical history and review of the current chart and the clinical presentation of this patient I advised him of the following:  I will request lab work for clotting deficiency  His clot was minor and distal in the right leg is current surgery is in the left leg. He will be going to facility for rehab. If this is the case he will be maintained on Lovenox. If he is going home will consider prophylactic dose of Eliquis until the immobilizer is removed.

## 2018-08-18 ENCOUNTER — HOSPITAL ENCOUNTER (EMERGENCY)
Age: 48
Discharge: HOME | End: 2018-08-18
Attending: EMERGENCY MEDICINE
Payer: MEDICARE

## 2018-08-18 VITALS
WEIGHT: 220 LBS | SYSTOLIC BLOOD PRESSURE: 127 MMHG | HEART RATE: 82 BPM | DIASTOLIC BLOOD PRESSURE: 80 MMHG | OXYGEN SATURATION: 100 % | RESPIRATION RATE: 16 BRPM | BODY MASS INDEX: 32.49 KG/M2 | TEMPERATURE: 97.1 F

## 2018-08-18 DIAGNOSIS — G89.18 POST-OP PAIN: Primary | ICD-10-CM

## 2018-08-18 LAB
ALBUMIN SERPL-MCNC: 3.8 G/DL (ref 3.5–5.2)
ALP BLD-CCNC: 55 U/L (ref 40–129)
ALT SERPL-CCNC: 34 U/L (ref 0–40)
ANION GAP SERPL CALCULATED.3IONS-SCNC: 17 MMOL/L (ref 7–16)
AST SERPL-CCNC: 35 U/L (ref 0–39)
BASOPHILS ABSOLUTE: 0.07 E9/L (ref 0–0.2)
BASOPHILS RELATIVE PERCENT: 0.6 % (ref 0–2)
BILIRUB SERPL-MCNC: 1 MG/DL (ref 0–1.2)
BUN BLDV-MCNC: 13 MG/DL (ref 6–20)
CALCIUM SERPL-MCNC: 9.2 MG/DL (ref 8.6–10.2)
CHLORIDE BLD-SCNC: 96 MMOL/L (ref 98–107)
CO2: 24 MMOL/L (ref 22–29)
CREAT SERPL-MCNC: 0.9 MG/DL (ref 0.7–1.2)
EOSINOPHILS ABSOLUTE: 0.26 E9/L (ref 0.05–0.5)
EOSINOPHILS RELATIVE PERCENT: 2.3 % (ref 0–6)
GFR AFRICAN AMERICAN: >60
GFR NON-AFRICAN AMERICAN: >60 ML/MIN/1.73
GLUCOSE BLD-MCNC: 110 MG/DL (ref 74–109)
HCT VFR BLD CALC: 43.5 % (ref 37–54)
HEMOGLOBIN: 15.2 G/DL (ref 12.5–16.5)
IMMATURE GRANULOCYTES #: 0.06 E9/L
IMMATURE GRANULOCYTES %: 0.5 % (ref 0–5)
INR BLD: 1.2
LYMPHOCYTES ABSOLUTE: 1.34 E9/L (ref 1.5–4)
LYMPHOCYTES RELATIVE PERCENT: 12 % (ref 20–42)
MCH RBC QN AUTO: 30.6 PG (ref 26–35)
MCHC RBC AUTO-ENTMCNC: 34.9 % (ref 32–34.5)
MCV RBC AUTO: 87.7 FL (ref 80–99.9)
MONOCYTES ABSOLUTE: 1.17 E9/L (ref 0.1–0.95)
MONOCYTES RELATIVE PERCENT: 10.4 % (ref 2–12)
NEUTROPHILS ABSOLUTE: 8.3 E9/L (ref 1.8–7.3)
NEUTROPHILS RELATIVE PERCENT: 74.2 % (ref 43–80)
PDW BLD-RTO: 11.9 FL (ref 11.5–15)
PLATELET # BLD: 308 E9/L (ref 130–450)
PMV BLD AUTO: 10 FL (ref 7–12)
POTASSIUM SERPL-SCNC: 3.8 MMOL/L (ref 3.5–5)
PROTHROMBIN TIME: 14.1 SEC (ref 9.3–12.4)
RBC # BLD: 4.96 E12/L (ref 3.8–5.8)
SODIUM BLD-SCNC: 137 MMOL/L (ref 132–146)
TOTAL PROTEIN: 7.3 G/DL (ref 6.4–8.3)
WBC # BLD: 11.2 E9/L (ref 4.5–11.5)

## 2018-08-18 PROCEDURE — 96374 THER/PROPH/DIAG INJ IV PUSH: CPT

## 2018-08-18 PROCEDURE — 96375 TX/PRO/DX INJ NEW DRUG ADDON: CPT

## 2018-08-18 PROCEDURE — 85610 PROTHROMBIN TIME: CPT

## 2018-08-18 PROCEDURE — 99284 EMERGENCY DEPT VISIT MOD MDM: CPT

## 2018-08-18 PROCEDURE — 85025 COMPLETE CBC W/AUTO DIFF WBC: CPT

## 2018-08-18 PROCEDURE — 36415 COLL VENOUS BLD VENIPUNCTURE: CPT

## 2018-08-18 PROCEDURE — 6360000002 HC RX W HCPCS: Performed by: EMERGENCY MEDICINE

## 2018-08-18 PROCEDURE — 80053 COMPREHEN METABOLIC PANEL: CPT

## 2018-08-18 RX ORDER — MORPHINE SULFATE 4 MG/ML
4 INJECTION, SOLUTION INTRAMUSCULAR; INTRAVENOUS ONCE
Status: COMPLETED | OUTPATIENT
Start: 2018-08-18 | End: 2018-08-18

## 2018-08-18 RX ORDER — ONDANSETRON 2 MG/ML
4 INJECTION INTRAMUSCULAR; INTRAVENOUS ONCE
Status: COMPLETED | OUTPATIENT
Start: 2018-08-18 | End: 2018-08-18

## 2018-08-18 RX ORDER — OXYCODONE HYDROCHLORIDE AND ACETAMINOPHEN 5; 325 MG/1; MG/1
1 TABLET ORAL EVERY 6 HOURS PRN
Qty: 10 TABLET | Refills: 0 | Status: SHIPPED | OUTPATIENT
Start: 2018-08-18 | End: 2018-08-21

## 2018-08-18 RX ADMIN — MORPHINE SULFATE 4 MG: 4 INJECTION, SOLUTION INTRAMUSCULAR; INTRAVENOUS at 12:36

## 2018-08-18 RX ADMIN — ONDANSETRON 4 MG: 2 INJECTION, SOLUTION INTRAMUSCULAR; INTRAVENOUS at 12:36

## 2018-08-18 ASSESSMENT — PAIN SCALES - GENERAL
PAINLEVEL_OUTOF10: 7
PAINLEVEL_OUTOF10: 6

## 2018-08-18 ASSESSMENT — ENCOUNTER SYMPTOMS
NAUSEA: 0
ABDOMINAL PAIN: 0
VOMITING: 0
SHORTNESS OF BREATH: 0
SORE THROAT: 0
COUGH: 0

## 2018-08-18 ASSESSMENT — PAIN DESCRIPTION - PAIN TYPE: TYPE: ACUTE PAIN

## 2018-08-18 ASSESSMENT — PAIN DESCRIPTION - LOCATION: LOCATION: KNEE

## 2018-08-18 ASSESSMENT — PAIN DESCRIPTION - ORIENTATION: ORIENTATION: LEFT

## 2018-08-18 NOTE — ED NOTES
Bed: 18A-18  Expected date:   Expected time:   Means of arrival:   Comments:  ems     Kasey Ny RN  08/18/18 112

## 2018-08-18 NOTE — ED PROVIDER NOTES
Michaela Sofia is a 50 y.o. male with PMH significant for recent surgery s/p traumatic knee injury during weight lifting presenting to the emergency department via EMS for anxiety. Patient reports he had surgery on Wednesday to repair muscle injury, performed here at [place] [address]. He was discharged to rehab facility. Patient claims that he did not feel that he wass receiving the appropriate amount of care at the rehab facility so he candice pema. Patient arrives today from home via EMS for anxiety stating he cannot get the proper care at home. He does not have the resources or supporting system to help in his recovery from surgery. He also states his pain is unable to be controlled at home and has difficulty ambulating. He does admit he did have a DVT in 2013, but denies similar symptoms today. The history is provided by the patient. Extremity Weakness   Severity:  Mild  Duration:  4 days  Progression:  Waxing and waning  Chronicity:  Recurrent  Context comment:  Recent surgery  Relieved by:  Pain relief  Worsened by:  Standing  Associated symptoms: difficulty walking    Associated symptoms: no abdominal pain, no chest pain, no cough, no dysuria, no numbness in extremities, no fever, no headaches, no nausea, no shortness of breath and no vomiting    Risk factors: recent stressors        Review of Systems   Constitutional: Negative for chills and fever. HENT: Negative for congestion and sore throat. Eyes: Negative for visual disturbance. Respiratory: Negative for cough and shortness of breath. Cardiovascular: Positive for leg swelling. Negative for chest pain. Gastrointestinal: Negative for abdominal pain, nausea and vomiting. Genitourinary: Negative for dysuria. Musculoskeletal: Positive for gait problem. Negative for neck pain. Skin: Negative for wound. Neurological: Negative for light-headedness and headaches. Psychiatric/Behavioral: Positive for agitation. Range    Sodium 137 132 - 146 mmol/L    Potassium 3.8 3.5 - 5.0 mmol/L    Chloride 96 (L) 98 - 107 mmol/L    CO2 24 22 - 29 mmol/L    Anion Gap 17 (H) 7 - 16 mmol/L    Glucose 110 (H) 74 - 109 mg/dL    BUN 13 6 - 20 mg/dL    CREATININE 0.9 0.7 - 1.2 mg/dL    GFR Non-African American >60 >=60 mL/min/1.73    GFR African American >60     Calcium 9.2 8.6 - 10.2 mg/dL    Total Protein 7.3 6.4 - 8.3 g/dL    Alb 3.8 3.5 - 5.2 g/dL    Total Bilirubin 1.0 0.0 - 1.2 mg/dL    Alkaline Phosphatase 55 40 - 129 U/L    ALT 34 0 - 40 U/L    AST 35 0 - 39 U/L   Protime-INR   Result Value Ref Range    Protime 14.1 (H) 9.3 - 12.4 sec    INR 1.2        Radiology  No orders to display         ------------------------- NURSING NOTES AND VITALS REVIEWED ---------------------------  Date / Time Roomed:  8/18/2018 11:26 AM  ED Bed Assignment:  18A/18A-18    The nursing notes within the ED encounter and vital signs as below have been reviewed. Patient Vitals for the past 24 hrs:   BP Temp Temp src Pulse Resp SpO2 Weight   08/18/18 1415 127/80 - - 82 16 100 % -   08/18/18 1127 (!) 148/92 97.1 °F (36.2 °C) Temporal 88 18 100 % 220 lb (99.8 kg)       Oxygen Saturation Interpretation: Normal    ------------------------------------------ PROGRESS NOTES ------------------------------------------  ED Course as of Aug 18 1421   Sat Aug 18, 2018   1324 Patient, Dr. Hal Licona and I had extensive conversation about disposition here today. Patient agrees he is anxious about being at home, however he believes he'll be able to tolerate it better now that he is at clarification on the rehab process. Patient ready for discharge. [MA]      ED Course User Index  [MA] Winnie Humphries,        1:29 PM  I have spoken with the patient and discussed todays results, in addition to providing specific details for the plan of care and counseling regarding the diagnosis and prognosis. Their questions are answered at this time and they are agreeable with the plan.  I

## 2018-08-20 ENCOUNTER — TELEPHONE (OUTPATIENT)
Dept: ORTHOPEDIC SURGERY | Age: 48
End: 2018-08-20

## 2018-08-20 DIAGNOSIS — S76.112D RUPTURE OF LEFT QUADRICEPS TENDON, SUBSEQUENT ENCOUNTER: Primary | ICD-10-CM

## 2018-08-21 NOTE — TELEPHONE ENCOUNTER
Printed please fax script to his desired home health.  Electronically signed by Tao Lee PA-C on 8/21/2018 at 3:39 PM

## 2018-08-22 LAB
FACTOR V LEIDEN: NEGATIVE
SPECIMEN: NORMAL

## 2018-08-24 ENCOUNTER — TELEPHONE (OUTPATIENT)
Dept: ORTHOPEDIC SURGERY | Age: 48
End: 2018-08-24

## 2018-08-24 DIAGNOSIS — Z86.718 HISTORY OF DVT (DEEP VEIN THROMBOSIS): ICD-10-CM

## 2018-08-24 DIAGNOSIS — S76.112D RUPTURE OF LEFT QUADRICEPS TENDON, SUBSEQUENT ENCOUNTER: Primary | ICD-10-CM

## 2018-08-24 RX ORDER — ASPIRIN 325 MG
325 TABLET, DELAYED RELEASE (ENTERIC COATED) ORAL 2 TIMES DAILY
Qty: 60 TABLET | Refills: 0 | Status: SHIPPED | OUTPATIENT
Start: 2018-08-24 | End: 2018-10-01

## 2018-08-30 ENCOUNTER — HOSPITAL ENCOUNTER (OUTPATIENT)
Dept: GENERAL RADIOLOGY | Age: 48
Discharge: HOME | End: 2018-09-01
Payer: MEDICARE

## 2018-08-30 ENCOUNTER — OFFICE VISIT (OUTPATIENT)
Dept: ORTHOPEDIC SURGERY | Age: 48
End: 2018-08-30
Payer: MEDICARE

## 2018-08-30 ENCOUNTER — TELEPHONE (OUTPATIENT)
Dept: ORTHOPEDIC SURGERY | Age: 48
End: 2018-08-30

## 2018-08-30 VITALS
TEMPERATURE: 98 F | HEART RATE: 79 BPM | HEIGHT: 68 IN | WEIGHT: 215 LBS | DIASTOLIC BLOOD PRESSURE: 78 MMHG | SYSTOLIC BLOOD PRESSURE: 122 MMHG | BODY MASS INDEX: 32.58 KG/M2

## 2018-08-30 DIAGNOSIS — S76.112D RUPTURE OF LEFT QUADRICEPS TENDON, SUBSEQUENT ENCOUNTER: ICD-10-CM

## 2018-08-30 DIAGNOSIS — S76.112D RUPTURE OF LEFT QUADRICEPS TENDON, SUBSEQUENT ENCOUNTER: Primary | ICD-10-CM

## 2018-08-30 PROCEDURE — 99213 OFFICE O/P EST LOW 20 MIN: CPT | Performed by: PHYSICIAN ASSISTANT

## 2018-08-30 PROCEDURE — 99024 POSTOP FOLLOW-UP VISIT: CPT | Performed by: PHYSICIAN ASSISTANT

## 2018-08-30 PROCEDURE — 73560 X-RAY EXAM OF KNEE 1 OR 2: CPT

## 2018-08-30 NOTE — TELEPHONE ENCOUNTER
# [phone] Received voicemail from Betsey Hui, [address] [facility] with [facility] saying start of care was done on 8-25-18. They will see patient 3 x a week for 3 weeks then 1 x a week for 1 week. Also asking for clarification on WB status for LLE since discharge papers from hospital, our office, and previous rehab facility seem to be stating different things. Patient is scheduled to see us today, 8-30-18 @ 1:45 pm, for his po check. I can update them with his WB status after he is seen today.

## 2018-08-30 NOTE — TELEPHONE ENCOUNTER
This was addressed at the patient's visit in office today 8/30/2018    Electronically signed by Aubree Marley PA-C on 8/30/2018 at 4:51 PM

## 2018-08-31 NOTE — PROGRESS NOTES
Sutures were removed from left knee without difficulty. Knee was wrapped in ace bandage and ROM brace was applied.   Faina Smith  8/31/18  10:58 AM
cap refill brisk in toes, foot warm/perfused. Compartments supple throughout thigh and leg. Calves soft non tender. /78 (Site: Left Arm, Position: Sitting, Cuff Size: Medium Adult)   Pulse 79   Temp 98 °F (36.7 °C) (Oral)   Ht 5' 8\" (1.727 m)   Wt 215 lb (97.5 kg)   BMI 32.69 kg/m²     XR:  Left knee x-rays were obtained demonstrating the patella to be in neutral position, mild arthritic change noted. No acute abnormalities. Assessment:   Diagnosis Orders   1. Rupture of left quadriceps tendon, subsequent encounter  XR KNEE LEFT (1-2 VIEWS)    [address]:  Removed sutures  WB:  Weight bearing as tolerated  Hinged Brace: On at all times, locked in extension for ambulation, can remove for bathing and therapy pe protocol  PT: Attend therapy following the extensor tendon repair protocol at the two week danny    DVT: Continue with Lovenox as ordered x 4 more weeks  Dressing: Can remove dressing in 1-2 days then open to air  Can shower in a couple days, NO Soaking or swimming  Follow up in 4 weeks no xrays need to be obtained. Patient is noted to be anxious again on exam regarding his progress, did reassure him we feel he is doing well post-operatively and will continue to follow him throughout his post operative course, he expressed that he has some difficulty taking care of himself at home he feels, did state this concern before and was admitted post-operative and transferred to rehab from which he signed out [facility]. Would need re-admitted if he wished to go rehab instead of using home health. He expressed understanding of this and wished to continue with home therapy, will call if additional issues or concerns. Home health orders updated today.   Electronically signed by Ethan Guevara PA-C on 8/31/2018 at 2:39 PM

## 2018-09-02 ENCOUNTER — HOSPITAL ENCOUNTER (INPATIENT)
Age: 48
LOS: 12 days | Discharge: NURSING, REHAB OR LONG-TERM CARE | DRG: 751 | End: 2018-09-14
Attending: EMERGENCY MEDICINE | Admitting: PSYCHIATRY & NEUROLOGY
Payer: MEDICARE

## 2018-09-02 DIAGNOSIS — F41.1 ANXIETY STATE: Primary | ICD-10-CM

## 2018-09-02 PROBLEM — F32.9 MDD (MAJOR DEPRESSIVE DISORDER), SINGLE EPISODE: Status: ACTIVE | Noted: 2018-09-02

## 2018-09-02 LAB
ACETAMINOPHEN LEVEL: <5 MCG/ML (ref 10–30)
ALBUMIN SERPL-MCNC: 3.9 G/DL (ref 3.5–5.2)
ALP BLD-CCNC: 56 U/L (ref 40–129)
ALT SERPL-CCNC: 61 U/L (ref 0–40)
AMPHETAMINE SCREEN, URINE: NOT DETECTED
ANION GAP SERPL CALCULATED.3IONS-SCNC: 13 MMOL/L (ref 7–16)
AST SERPL-CCNC: 31 U/L (ref 0–39)
BACTERIA: ABNORMAL /HPF
BARBITURATE SCREEN URINE: NOT DETECTED
BASOPHILS ABSOLUTE: 0.04 E9/L (ref 0–0.2)
BASOPHILS RELATIVE PERCENT: 0.4 % (ref 0–2)
BENZODIAZEPINE SCREEN, URINE: NOT DETECTED
BILIRUB SERPL-MCNC: 1 MG/DL (ref 0–1.2)
BILIRUBIN URINE: NEGATIVE
BLOOD, URINE: ABNORMAL
BUN BLDV-MCNC: 11 MG/DL (ref 6–20)
CALCIUM SERPL-MCNC: 9.4 MG/DL (ref 8.6–10.2)
CANNABINOID SCREEN URINE: POSITIVE
CHLORIDE BLD-SCNC: 100 MMOL/L (ref 98–107)
CLARITY: CLEAR
CO2: 24 MMOL/L (ref 22–29)
COCAINE METABOLITE SCREEN URINE: NOT DETECTED
COLOR: YELLOW
CREAT SERPL-MCNC: 0.9 MG/DL (ref 0.7–1.2)
EKG ATRIAL RATE: 78 BPM
EKG P AXIS: 34 DEGREES
EKG P-R INTERVAL: 158 MS
EKG Q-T INTERVAL: 388 MS
EKG QRS DURATION: 94 MS
EKG QTC CALCULATION (BAZETT): 442 MS
EKG R AXIS: -19 DEGREES
EKG T AXIS: 34 DEGREES
EKG VENTRICULAR RATE: 78 BPM
EOSINOPHILS ABSOLUTE: 0.04 E9/L (ref 0.05–0.5)
EOSINOPHILS RELATIVE PERCENT: 0.4 % (ref 0–6)
ETHANOL: <10 MG/DL (ref 0–0.08)
GFR AFRICAN AMERICAN: >60
GFR NON-AFRICAN AMERICAN: >60 ML/MIN/1.73
GLUCOSE BLD-MCNC: 106 MG/DL (ref 74–109)
GLUCOSE URINE: NEGATIVE MG/DL
HCT VFR BLD CALC: 40.9 % (ref 37–54)
HEMOGLOBIN: 14.2 G/DL (ref 12.5–16.5)
IMMATURE GRANULOCYTES #: 0.06 E9/L
IMMATURE GRANULOCYTES %: 0.5 % (ref 0–5)
KETONES, URINE: 15 MG/DL
LEUKOCYTE ESTERASE, URINE: NEGATIVE
LYMPHOCYTES ABSOLUTE: 1.58 E9/L (ref 1.5–4)
LYMPHOCYTES RELATIVE PERCENT: 14 % (ref 20–42)
MCH RBC QN AUTO: 30.7 PG (ref 26–35)
MCHC RBC AUTO-ENTMCNC: 34.7 % (ref 32–34.5)
MCV RBC AUTO: 88.3 FL (ref 80–99.9)
METHADONE SCREEN, URINE: NOT DETECTED
MONOCYTES ABSOLUTE: 1 E9/L (ref 0.1–0.95)
MONOCYTES RELATIVE PERCENT: 8.8 % (ref 2–12)
NEUTROPHILS ABSOLUTE: 8.58 E9/L (ref 1.8–7.3)
NEUTROPHILS RELATIVE PERCENT: 75.9 % (ref 43–80)
NITRITE, URINE: NEGATIVE
OPIATE SCREEN URINE: NOT DETECTED
PDW BLD-RTO: 11.9 FL (ref 11.5–15)
PH UA: 6.5 (ref 5–9)
PHENCYCLIDINE SCREEN URINE: NOT DETECTED
PLATELET # BLD: 389 E9/L (ref 130–450)
PMV BLD AUTO: 10 FL (ref 7–12)
POTASSIUM SERPL-SCNC: 3.5 MMOL/L (ref 3.5–5)
PROPOXYPHENE SCREEN: NOT DETECTED
PROTEIN UA: NEGATIVE MG/DL
RBC # BLD: 4.63 E12/L (ref 3.8–5.8)
RBC UA: ABNORMAL /HPF (ref 0–2)
SALICYLATE, SERUM: <0.3 MG/DL (ref 0–30)
SODIUM BLD-SCNC: 137 MMOL/L (ref 132–146)
SPECIFIC GRAVITY UA: 1.01 (ref 1–1.03)
TOTAL PROTEIN: 7 G/DL (ref 6.4–8.3)
TRICYCLIC ANTIDEPRESSANTS SCREEN SERUM: NEGATIVE NG/ML
UROBILINOGEN, URINE: 0.2 E.U./DL
WBC # BLD: 11.3 E9/L (ref 4.5–11.5)
WBC UA: ABNORMAL /HPF (ref 0–5)

## 2018-09-02 PROCEDURE — 80307 DRUG TEST PRSMV CHEM ANLYZR: CPT

## 2018-09-02 PROCEDURE — 99285 EMERGENCY DEPT VISIT HI MDM: CPT

## 2018-09-02 PROCEDURE — 80053 COMPREHEN METABOLIC PANEL: CPT

## 2018-09-02 PROCEDURE — 85025 COMPLETE CBC W/AUTO DIFF WBC: CPT

## 2018-09-02 PROCEDURE — G0480 DRUG TEST DEF 1-7 CLASSES: HCPCS

## 2018-09-02 PROCEDURE — 6370000000 HC RX 637 (ALT 250 FOR IP): Performed by: PSYCHIATRY & NEUROLOGY

## 2018-09-02 PROCEDURE — 81001 URINALYSIS AUTO W/SCOPE: CPT

## 2018-09-02 PROCEDURE — 36415 COLL VENOUS BLD VENIPUNCTURE: CPT

## 2018-09-02 PROCEDURE — 1240000000 HC EMOTIONAL WELLNESS R&B

## 2018-09-02 PROCEDURE — 93005 ELECTROCARDIOGRAM TRACING: CPT | Performed by: EMERGENCY MEDICINE

## 2018-09-02 RX ORDER — HYDROXYZINE PAMOATE 50 MG/1
50 CAPSULE ORAL EVERY 6 HOURS PRN
Status: DISCONTINUED | OUTPATIENT
Start: 2018-09-02 | End: 2018-09-14 | Stop reason: HOSPADM

## 2018-09-02 RX ORDER — HALOPERIDOL 5 MG/ML
10 INJECTION INTRAMUSCULAR EVERY 6 HOURS PRN
Status: DISCONTINUED | OUTPATIENT
Start: 2018-09-02 | End: 2018-09-14 | Stop reason: HOSPADM

## 2018-09-02 RX ORDER — TRAZODONE HYDROCHLORIDE 50 MG/1
50 TABLET ORAL NIGHTLY PRN
Status: DISCONTINUED | OUTPATIENT
Start: 2018-09-02 | End: 2018-09-14 | Stop reason: HOSPADM

## 2018-09-02 RX ORDER — OLANZAPINE 10 MG/1
10 TABLET ORAL
Status: DISPENSED | OUTPATIENT
Start: 2018-09-02 | End: 2018-09-02

## 2018-09-02 RX ORDER — BENZTROPINE MESYLATE 1 MG/ML
2 INJECTION INTRAMUSCULAR; INTRAVENOUS 2 TIMES DAILY PRN
Status: DISCONTINUED | OUTPATIENT
Start: 2018-09-02 | End: 2018-09-14 | Stop reason: HOSPADM

## 2018-09-02 RX ORDER — MAGNESIUM HYDROXIDE/ALUMINUM HYDROXICE/SIMETHICONE 120; 1200; 1200 MG/30ML; MG/30ML; MG/30ML
30 SUSPENSION ORAL PRN
Status: DISCONTINUED | OUTPATIENT
Start: 2018-09-02 | End: 2018-09-14 | Stop reason: HOSPADM

## 2018-09-02 RX ORDER — ACETAMINOPHEN 325 MG/1
650 TABLET ORAL EVERY 4 HOURS PRN
Status: DISCONTINUED | OUTPATIENT
Start: 2018-09-02 | End: 2018-09-14 | Stop reason: HOSPADM

## 2018-09-02 RX ADMIN — HYDROXYZINE PAMOATE 50 MG: 50 CAPSULE ORAL at 18:29

## 2018-09-02 ASSESSMENT — PATIENT HEALTH QUESTIONNAIRE - PHQ9: SUM OF ALL RESPONSES TO PHQ QUESTIONS 1-9: 17
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
## 2018-09-02 NOTE — ED PROVIDER NOTES
Department of Emergency Medicine   ED  Provider Note  Admit Date/RoomTime: 9/2/2018  1:48 PM  ED Room: [address] [place], [place] [id number]              9/2/18  1:57 PM    HPI: Michaela Sofia 50 y.o. male presents with a complaint of anxiety,depression and feeling overwhelmed beginning prior to arrival. Complaint has been constant and became more severe today which is what prompted the visit. Patient reports that he is feeling overwhelmed and anxious. He reports that he takes care of his brother who is developmentally delayed at home and reports that after he had a recent procedure on his knee and trying to care for himself and his brother that he has been significant overwhelmed. He says that he is not able to do all the things to be done care from them. He says this is causing him to feel depressed and very anxious. He denies suicidal thoughts. He just says that he feels overwhelmed and needs more help. No fever, chills, chest pain, sob, abd pain, back pain, orthopnea, leg swelling or syncope         Review of Systems:   Pertinent positives and negatives are stated within HPI, all other systems reviewed and are negative.        --------------------------------------------- PAST HISTORY ---------------------------------------------  Past Medical History:  has a past medical history of History of DVT (deep vein thrombosis); Hyperlipidemia; Hypothyroid; Psoriasis; Reflux esophagitis; Thyroid disease; and Traumatic leg injury. Past Surgical History:  has a past surgical history that includes pr musc/tendon repair each; arm/elbow (Left, 8/15/2018) and Leg Tendon Surgery (08/15/2018). Social History:  reports that he has never smoked. He has never used smokeless tobacco. He reports that he drinks about 12.6 oz of alcohol per week . He reports that he uses drugs, including Marijuana, about 7 times per week. Family History: family history includes Cancer in his father and mother; Stroke in his father and paternal uncle.      The

## 2018-09-02 NOTE — ED NOTES
Received phone call from [facility] on-call Jaret Chao who is gathering information regarding patient and his brother. [facility] board to reach out to family friend regarding Haleigh Paddy.      Prema Qureshi, GEORGE, LSW  09/02/18 4534

## 2018-09-02 NOTE — ED NOTES
Completed patient assessment, CSSR-S, & SBIRT. Pt admits to passive suicidal ideation, per patient he does not have a will to live. Patient denies HI. Patient is a 50year old, male presenting to ED for increased depression & anxiety. Patient reports that he does not feel he is mentally strong enough to cope with his leg injury and caring for himself/his developmentally delayed younger brother. 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 Per patient he tore a tendon while weight lifting on 8/10/2018. Per patient following discharge he was sent to an acute rehab facility where he signed himself out AMA due to stressors with caring for his brother & taking care of finances. Per patient since then, his home has become very un-cared for & odorous. Pt reports that there is food in the home but that it is old and he is fearful to eat some of it. Per patient he has been experiencing what appears to be increased panic attacks that makes him feel as if he is dying. Patient reports that since the roles have reversed and he has needed his younger brother's help to no avail - he has been struggling. Patient reports he is unable to complete basic ADL skills and has been lacking in his hygiene. Patient reports that he has not been taking any medications or eating intentionally due to being irrationally fearful of both living and dying. Patient reports that he has no will nor energy. Patient reports that a [address] comes into his home every other day to assist with therapy but that he has been \"failing\". Patient denies hx of mental health dx, no current/past treatment, and no hx of psychiatric hospitalizations. Pt reports poor sleep, poor appetite, & increased feelings of hopelessness/helplessness. Pt denies hx of suicide attempt or self injurious behaviors. Pt denies drug/alcohol use.     Pt cooperative, oriented x 4, depressed/anxious mood, flat affect, clear

## 2018-09-03 PROBLEM — F32.2 MAJOR DEPRESSIVE DISORDER, SEVERE (HCC): Status: ACTIVE | Noted: 2018-09-03

## 2018-09-03 PROCEDURE — 6370000000 HC RX 637 (ALT 250 FOR IP): Performed by: NURSE PRACTITIONER

## 2018-09-03 PROCEDURE — 6370000000 HC RX 637 (ALT 250 FOR IP): Performed by: PSYCHIATRY & NEUROLOGY

## 2018-09-03 PROCEDURE — 99221 1ST HOSP IP/OBS SF/LOW 40: CPT | Performed by: PSYCHIATRY & NEUROLOGY

## 2018-09-03 PROCEDURE — 6360000002 HC RX W HCPCS: Performed by: NURSE PRACTITIONER

## 2018-09-03 PROCEDURE — 1240000000 HC EMOTIONAL WELLNESS R&B

## 2018-09-03 RX ORDER — CHOLECALCIFEROL (VITAMIN D3) 50 MCG
2000 TABLET ORAL DAILY
Status: DISCONTINUED | OUTPATIENT
Start: 2018-09-03 | End: 2018-09-14 | Stop reason: HOSPADM

## 2018-09-03 RX ORDER — ATORVASTATIN CALCIUM 20 MG/1
20 TABLET, FILM COATED ORAL DAILY
Status: DISCONTINUED | OUTPATIENT
Start: 2018-09-03 | End: 2018-09-14 | Stop reason: HOSPADM

## 2018-09-03 RX ORDER — VENLAFAXINE HYDROCHLORIDE 37.5 MG/1
37.5 CAPSULE, EXTENDED RELEASE ORAL
Status: DISCONTINUED | OUTPATIENT
Start: 2018-09-04 | End: 2018-09-08

## 2018-09-03 RX ORDER — LEVOTHYROXINE SODIUM 0.03 MG/1
25 TABLET ORAL DAILY
Status: DISCONTINUED | OUTPATIENT
Start: 2018-09-03 | End: 2018-09-14 | Stop reason: HOSPADM

## 2018-09-03 RX ADMIN — ENOXAPARIN SODIUM 30 MG: 30 INJECTION SUBCUTANEOUS at 11:00

## 2018-09-03 RX ADMIN — Medication 2000 UNITS: at 18:17

## 2018-09-03 RX ADMIN — LEVOTHYROXINE SODIUM 25 MCG: 25 TABLET ORAL at 08:27

## 2018-09-03 RX ADMIN — TRAZODONE HYDROCHLORIDE 50 MG: 50 TABLET ORAL at 02:40

## 2018-09-03 RX ADMIN — ENOXAPARIN SODIUM 30 MG: 30 INJECTION SUBCUTANEOUS at 21:17

## 2018-09-03 RX ADMIN — HYDROXYZINE PAMOATE 50 MG: 50 CAPSULE ORAL at 02:40

## 2018-09-03 ASSESSMENT — LIFESTYLE VARIABLES
HISTORY_ALCOHOL_USE: YES
HISTORY_ALCOHOL_USE: YES

## 2018-09-03 ASSESSMENT — SLEEP AND FATIGUE QUESTIONNAIRES
DIFFICULTY STAYING ASLEEP: YES
DIFFICULTY FALLING ASLEEP: YES
DIFFICULTY ARISING: NO
AVERAGE NUMBER OF SLEEP HOURS: 3
DO YOU USE A SLEEP AID: NO
DIFFICULTY ARISING: NO
DO YOU HAVE DIFFICULTY SLEEPING: YES
DIFFICULTY FALLING ASLEEP: YES
SLEEP PATTERN: DIFFICULTY FALLING ASLEEP;RESTLESSNESS
RESTFUL SLEEP: NO
SLEEP PATTERN: DIFFICULTY FALLING ASLEEP;RESTLESSNESS
DO YOU HAVE DIFFICULTY SLEEPING: YES
RESTFUL SLEEP: NO
DIFFICULTY STAYING ASLEEP: YES
AVERAGE NUMBER OF SLEEP HOURS: 3
DO YOU USE A SLEEP AID: NO

## 2018-09-03 ASSESSMENT — PAIN SCALES - GENERAL
PAINLEVEL_OUTOF10: 0
PAINLEVEL_OUTOF10: 0

## 2018-09-03 ASSESSMENT — PATIENT HEALTH QUESTIONNAIRE - PHQ9
SUM OF ALL RESPONSES TO PHQ QUESTIONS 1-9: 19
SUM OF ALL RESPONSES TO PHQ QUESTIONS 1-9: 19

## 2018-09-03 NOTE — PROGRESS NOTES
SW completed Pt's assessment and CSSR-S. Pt reports he came to the hospital to seek help for his deteriorating physical and mental health. Pt reports he lives with his brother who is developmentally disabled. He reports he has been his brother's care taker but reports that he had an injury to his knee that required surgery about a month ago which has affected him taking care of his brother and himself. Pt reports that he has been very depressed and has not been able to complete basic ADLs. He reports that his brother has been trying to help him complete basic tasks but that it is challenging because his brother also needs help. Pt reports that he is isolated from friends and family. He does have a couple friends who help him from time to time. Pt is not working and reports that he has gotten assistance from [employer] for utilities. Pt was preoccupied about his living conditions and the well being of his brother. SW reassured Pt that the ED SW notes indicate that a friend of the family is checking in on the brother while he is here. Pt reports his living conditions are not good due to being unable to complete chores and complete household responsibilities. Pt's thoughts were circular and irrational during assessment. Pt had difficulty focusing on assessment questions. Pt denies current SI/HI and A/V hallucinations. DC planning:  Pt was unable to focus and discuss discharge planning. Pt is aware that he needs mental health treatment when he leaves here. Look into home health care or rehab services for physical health needs.

## 2018-09-03 NOTE — PROGRESS NOTES
Group Therapy Note    Patient attended community meeting. Patient identified daily goal as to try to be more calm.   Group Therapy Note     Date: 9/3/2018  Start Time: 10:00  End Time:  10:45  Number of Participants: 7     Type of Group: Psychoeducation     Wellness Binder Information  Module Name: Coping Skills   Session Number: NA     Patient's Goal: To identify positive coping skills to use on a daily basis.     Status After Intervention:  Unchanged    Participation Level: Minimal    Participation Quality: Attentive      Speech:  hesitant      Thought Process/Content: Perseverating      Affective Functioning: Flat      Mood: anxious and depressed      Level of consciousness:  Preoccupied      Response to Learning: Progressing to goal      Endings: None Reported    Modes of Intervention: Education      Discipline Responsible: Psychoeducational Specialist      Signature:  RONAL Mcdaniel
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
## 2018-09-03 NOTE — PROGRESS NOTES
Screening:  Practical Counseling, on admission, danny X, if applicable and completed (first 3 are required if patient doesn't refuse):            ( )  Recognizing danger situations (included triggers and roadblocks)                    ( )  Coping skills (new ways to manage stress, exercise, relaxation techniques, changing routine, distraction)                                                           ( )  Basic information about quitting (benefits of quitting, techniques in how to quit, available resources  ( ) Referral for counseling faxed to [facility]          ( ) Patient refused counseling  (X) Patient has not smoked in the last 30 days    Metabolic Screening:    Lab Results   Component Value Date    LABA1C 5.7 10/17/2017       No results for input(s): CHOL, TRIG, HDL, LDLCALC, LABVLDL in the last 72 hours. Body mass index is 32.49 kg/m². BP Readings from Last 2 Encounters:   09/03/18 (!) 142/89   08/30/18 122/78           Pt admitted with followings belongings:  Dentures: None  Vision - Corrective Lenses: Glasses  Hearing Aid: None  Jewelry: None  Body Piercings Removed: N/A  Clothing: None  Were All Patient Medications Collected?: Not Applicable  Other Valuables: Crutches (1 crutch)     No valuables or home medications. Patient oriented to surroundings and program expectations and copy of patient rights given. Received admission packet:  Yes. Consents reviewed, signed Yes. Patient verbalize understanding:  Yes.     Patient education on precautions: Yes.                   Franko Matthews RN

## 2018-09-03 NOTE — PROGRESS NOTES
Pt is very pressured. Pt is anxious and is refusing to take PRNs when offered. This nurse offered to call [place] Police or APS, or family friend to check on patient brother patient refused. Pt was assisted with perineal care (by set up). Pt was able to was his own grion and genitals with soap and water. Pt has no plans to harm himself at this time. Pt reported no hallucinations.

## 2018-09-03 NOTE — PROGRESS NOTES
Pt is very helpless and hopless. \"pt is constantly stating I can't do this, I can't do anything. \" Pt has no plans to harm himself or others. Pt took all medications.

## 2018-09-03 NOTE — PLAN OF CARE
Problem: Altered Mood, Depressive Behavior:  Goal: Able to verbalize support systems  Able to verbalize support systems   Outcome: Met This Shift    Goal: Absence of self-harm  Absence of self-harm   Outcome: Met This Shift
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
## 2018-09-03 NOTE — PLAN OF CARE
Problem: Altered Mood, Deterioration in Function:  Goal: Able to verbalize reality based thinking  Able to verbalize reality based thinking   Outcome: Ongoing

## 2018-09-04 PROCEDURE — 6370000000 HC RX 637 (ALT 250 FOR IP): Performed by: NURSE PRACTITIONER

## 2018-09-04 PROCEDURE — 1240000000 HC EMOTIONAL WELLNESS R&B

## 2018-09-04 PROCEDURE — 6370000000 HC RX 637 (ALT 250 FOR IP): Performed by: PSYCHIATRY & NEUROLOGY

## 2018-09-04 PROCEDURE — 6360000002 HC RX W HCPCS: Performed by: NURSE PRACTITIONER

## 2018-09-04 PROCEDURE — 97166 OT EVAL MOD COMPLEX 45 MIN: CPT

## 2018-09-04 PROCEDURE — 99231 SBSQ HOSP IP/OBS SF/LOW 25: CPT | Performed by: PSYCHIATRY & NEUROLOGY

## 2018-09-04 PROCEDURE — 97161 PT EVAL LOW COMPLEX 20 MIN: CPT

## 2018-09-04 PROCEDURE — G8987 SELF CARE CURRENT STATUS: HCPCS

## 2018-09-04 PROCEDURE — G8978 MOBILITY CURRENT STATUS: HCPCS

## 2018-09-04 PROCEDURE — G8988 SELF CARE GOAL STATUS: HCPCS

## 2018-09-04 PROCEDURE — 97530 THERAPEUTIC ACTIVITIES: CPT

## 2018-09-04 PROCEDURE — G8979 MOBILITY GOAL STATUS: HCPCS

## 2018-09-04 RX ADMIN — ATORVASTATIN CALCIUM 20 MG: 20 TABLET, FILM COATED ORAL at 08:20

## 2018-09-04 RX ADMIN — VENLAFAXINE HYDROCHLORIDE 37.5 MG: 37.5 CAPSULE, EXTENDED RELEASE ORAL at 08:20

## 2018-09-04 RX ADMIN — ENOXAPARIN SODIUM 30 MG: 30 INJECTION SUBCUTANEOUS at 08:21

## 2018-09-04 RX ADMIN — LEVOTHYROXINE SODIUM 25 MCG: 25 TABLET ORAL at 08:20

## 2018-09-04 RX ADMIN — Medication 2000 UNITS: at 08:20

## 2018-09-04 RX ADMIN — ENOXAPARIN SODIUM 30 MG: 30 INJECTION SUBCUTANEOUS at 20:54

## 2018-09-04 ASSESSMENT — PAIN SCALES - GENERAL
PAINLEVEL_OUTOF10: 0
PAINLEVEL_OUTOF10: 0

## 2018-09-04 NOTE — PROGRESS NOTES
Physical Therapy    Facility/Department: [address]  Initial Assessment    NAME: Alexus Gomez  : 1970  MRN: [id number]    Date of Service: 2018  Evaluating Therapist: KOKI Cm Recommendations: front wheeled walker    Room #: 7305-A  DIAGNOSIS: Major depressive disorder  PRECAUTIONS: Fall risk, WBAT LLE with KI per outpatient note on   PMHx: 8/15/18 L quad tendon open repair    Social:  Pt lives with his brother (LIN) in a 1 floor plan 2 steps and 2 rails to enter. Prior to admission pt walked with B Axillary Crutches. Initial Evaluation  Date: 18 Treatment  Date: Short Term/ Long Term   Goals   Was pt agreeable to Eval/treatment? Yes     Does pt have pain? Denied     Bed Mobility  Rolling: Independent  Supine to sit: Independent  Sit to supine: Independent  Scooting: Independent     Transfers Sit to stand: Supervision  Stand to sit: Supervision  Stand pivot: Supervision with [employer] [address]  Modified Independent   Ambulation   60 feet with WW with Supervision  >150 feet with [address] with Modified [place]   Stair negotiation: ascended and descended NT  >2 steps with 2 rails with Modified [place]   AM-PAC Raw Score 20/24       BLE ROM is WFL with exception of the L knee that was maintained in [place]. RLE strength is grossly 4/5 to 4+/5. LLE strength is grossly 4/5 with exception of the knee that was NT. Seated Balance: Independent at EOB  Standing Balance: Supervision dynamically with WW  Edema: Mild L knee swelling  Endurance: Good, no MAURICE  Skin was inspected: L knee swelling, incision with no signs of infection    ASSESSMENT  Pt displays functional ability as noted in the objective portion of this evaluation. Comments/Treatment:  The pt required motivation throughout the evaluation to look forward and stay engaged with his surroundings. The pt required no physical assistance throughout the session and was able to respond well to VCs for appropriate use of [address].  The pt ambulated through the room but requested to stay within the room. The pt ambulated to a  and then transferred back to his bed. The pt was left supine in bed resting comfortably. Discussed pt's performance with nursing and requested FWW for pt's room. Patient education  Pt educated on sequencing with [employer].    Patient response to education:   Pt verbalized understanding Pt demonstrated skill Pt requires further education in this area   Yes Yes Reinforce     Rehab potential is Good for reaching above PT goals. Pts/ family goals   1. To return home    Patient and or family understand(s) diagnosis, prognosis, and plan of care: Yes    PLAN  PT care will be provided in accordance with the objectives noted above. Whenever appropriate, clear delegation orders will be provided for nursing staff. Exercises and functional mobility practice will be used as well as appropriate assistive devices or modalities to obtain goals. Patient and family education will also be administered as needed. Frequency of treatments will be 2-5x/week x 3-5 days. Time in: 1615  Time out: Miya [address].  Zelda Toth, [address]  number:  PT [phone]

## 2018-09-04 NOTE — PROGRESS NOTES
directives as wash your hands. poor Problem solving skills  fair Memory   poor Sequencing  Additional Comments: alert and cooperative , followed commands with fair safety  Sensory:   Hearing: WFL  Vision: WFL    Glasses: yes [x] no [] reading []      UE Assessment:  Hand Dominance: Right [x]  Left []     Strength ROM Additional Info:    RUE   4+/5 WFL good  and WFL FMC/dexterity noted during ADL tasks     LUE 4+/5 WFL good  and WFL FMC/dexterity noted during ADL tasks   Sensation: intact  Tone: WFL  Edema:min noted on L knee    Functional Assessment:   Initial Eval Status 9/4/18 Comments   Feeding  Supervision with poor initiation and sequencing    Grooming  Supervision with increased time and cues to complete hand washing    Upper Body Dressing Setup      Lower Body Dressing Mod A     Bathing Mod A    Toileting  SBA with increased time    Bed Mobility  Supine to Sit: n/t  Sit to Supine:n/t    Functional Transfers Sit to stand SBA with ww and increased time and cues   Functional Mobility SBA with ww household distance       Sit balance: good  Stand balance: good-  Endurance/Activity tolerance: fair+ noted to have good O2 97-99% room air yet stated that he is tired from medication                              Comments: Upon arrival pt supine in bed. At end of session pt sitting up in day room attempting to fill out menu with all devices within reach, all lines and tubes intact. Nursing notified. Treatment: OT evaluation, reviewed chart and determined he is now WBAT with knee immobilizer to LLE, walker safety education, grooming task with increased time and noted indecisiveness and poor sequencing requiring mod cues to complete hand washing, functional mobility on unit, unable to process simple tasks without cueing, noted to be self limiting with poor outlook for progress, reassured patient that he is doing well physically and is strong and able to improve, O2 sat monitoring.     Assessment of current deficits   Functional mobility [x]  ADLs [x] Strength []  Cognition [x]  Functional transfers  [x] IADLs [x] Safety Awareness [x]  Endurance [x]  Fine Motor Coordination [] Balance [] Vision/perception [] Sensation []   Gross Motor Coordination [] ROM []       Eval Complexity: mod   Profile and History- mod  Assessment of Occupational Performance and Identification of Deficits- mod  Clinical Decision Making- mod    Treatment frequency: OT 2-4x     Plan of Care:  ADL retraining []   Equipment needs []   Neuromuscular re-education []  Energy Conservation Techniques []  Functional Transfer training []  Patient and/or Family Education []  Functional Mobility training []  Environmental Modifications []  Cognitive re-training []    Compensatory techniques for ADLs []  Splinting Needs []   Positioning to improve overall function []  Other: []      Rehab Potential: good-    Patient / Family Goal: wants to go home and care for brother    Short term goals  Time Frame: 2-4 days  STG #1:  Demo good follow through of walker safety precautions in ADL's and transfers      STG #2:  Increase grooming to mod I in standing/ sitting with ww  STGl #3   Increase UE dressing and bathing to mod I   STG #4   Increase LE dressing and bathing to mod I with AE prn   STG #5   Increase functional commode transfer to mod I with ww  STG #6   Increase safety and problem solving to good- during ADL's  STG #7   Increase functional activity tolerance to good in ADL tasks      Patient and/or family understands diagnosis, prognosis and plan of care: yes    [] Malnutrition indicators have been identified and nursing has been notified to ensure a dietitian consult is ordered.      Comments: Based on patient's functional performance as stated above and level of assistance needed prior to admission, this therapist believes that the patient melisa benefit from further skilled [address] [place] [address] following hospital stay in an effort to increase safety, functional independence, and quality of life. Time in: 9:00  Time out: 9:25  OT evaluation + 10 min. treatment    Cristin Barraza.  [address], [address]

## 2018-09-04 NOTE — PLAN OF CARE
Problem: Altered Mood, Depressive Behavior:  Goal: Able to verbalize and/or display a decrease in depressive symptoms  Able to verbalize and/or display a decrease in depressive symptoms   Outcome: Ongoing  Slowly

## 2018-09-04 NOTE — PROGRESS NOTES
pain []  joint pain  Neurologic Symptoms: []  headache []  dizziness  Hematolymphoid Symptoms: [] Adenopathy [] Bruises   [] Schimosis       Psychiatric Review of systems  Delusions:  [] Denies [] Endorses   Withdrawals:  [] Denies [] Endorses    Hallucinations: [] Denies [] Endorses    Extra Pyramidal Symptoms: [] Denies [] Endorses      /79   Pulse 80   Temp 98.6 °F (37 °C) (Temporal)   Resp 16   Ht 5' 9\" (1.753 m)   Wt 220 lb (99.8 kg)   SpO2 97%   BMI 32.49 kg/m²     Mental Status Examination:    Cognition:      [x] Alert  [x] Awake  [x] Oriented  [x] Person  [x] Place [x] Time      [] drowsy  [] tired  [] lethargic  [] distractable  [] Other     Attention/Concentration:   [] Attentive  [x] Distracted        Memory Recent and Remote: [x] Intact   [] Impaired [] Partially Impaired     Language: [x] Able to recognize and name objects          [] Unable to recognize and name Objects    Fund of Knowledge:  [x] Poor []  Fair  [] Good    Speech: [] Normal  [x] Soft  [] Slow  [x] Fast [] Pressured            [] Loud [] Dysarthria  [] Incoherent    Appearance: [] Well Groomed  [] Casual Dressed  [] Unkept  [x] Disheveled          [] Normal weight[] Thin  [] Overweight  [] Obese           Attitude: [] Positive  [] Hostile  [] Demanding  [] Guarded  [] Defensive         [x] Cooperative  []  Uncooperative      Behavior:  [] Normal Gait  [] Walks with Assistance  [] Angela Chair    [] Walks with Mac Keens  [x] In Hospital Bed  [] Sitting in Chair    Muscle-Skeletal:  [] Normal Muscle Tone [] Muscle Atrophy       [] Abnormal Muscle Movement     Eye Contact:  [] Good eye contact  [x] Intermittent Eye Contact  [] Poor Eye Contact     Mood: [x] Depressed  [x] Anxious  [] Irritated  [] Euthymic   [] Angry [] Restless    Affect:  [x] Congruent  [] Incongruent  [] Labile  [] Constricted  [x] Flat  [] Bizarre     Thought Process and Association:  [] Logical [x] Illogical       [] Linear and Goal Directed  [x] Tangential  []

## 2018-09-04 NOTE — PROGRESS NOTES
Pt did  attend and did  participate in groups. dedressed mood, sad affect. poor insight and poor judgement. Pt states he will  talk with staff if they experience feelings of SI or HI. No c/o or aggressive behavior. had interactions with staff and peers. Monitored for safety, encouraged to express feelings, and to attend groups. Pt was  cooperative and showed  response to encouragement. Pt was  med compliant. .  Will continue to monitor.

## 2018-09-04 NOTE — PROGRESS NOTES
Patient was awake and verbalized to this nurse that he thinks he is near the end. Educated client that his vitals are stable, but he needs to consume more water do to urine is concentrated. Provided fresh cold water and cup. Patient was not redirectable continued to talk of ending doom. Will continue to monitor with every 15 minute rounds.

## 2018-09-04 NOTE — PROGRESS NOTES
Group Therapy Note    Date: 9/4/2018  Start Time:  1:30  End Time:  1:55  Number of Participants: 6    Type of Group: Cognitive Skills    Wellness Binder Information  Module Name:  Spiritual Wellness  Session Number:  1    Patient's Goal:  Patient will identify the role hope plays in recovery. Notes:  Patient was interactive during group sharing the role hope plays in recovery. Patient shared he was having difficulty today but was able to accept feedback and support from others. Status After Intervention:  Improved    Participation Level:  Active Listener and Interactive    Participation Quality: Appropriate, Attentive, Sharing and Supportive      Speech:  normal      Thought Process/Content: Logical      Affective Functioning: Congruent      Mood: depressed      Level of consciousness:  Alert, Oriented x4 and Attentive      Response to Learning: Able to verbalize current knowledge/experience, Able to verbalize/acknowledge new learning, Able to retain information, Capable of insight, Able to change behavior and Progressing to goal      Endings: None Reported    Modes of Intervention: Education, Support, Socialization, Exploration, Clarifying, Problem-solving and Activity      Discipline Responsible: Psychoeducational Specialist      Signature:  Vaughn Herndon

## 2018-09-04 NOTE — PLAN OF CARE
Problem: Altered Mood, Depressive Behavior:  Goal: Ability to disclose and discuss suicidal ideas will improve  Ability to disclose and discuss suicidal ideas will improve   Outcome: Met This Shift  Denies suicidal ideations, feels overwhelmed at home with care of brother at this time.

## 2018-09-04 NOTE — PROGRESS NOTES
Patient attended community meeting. Shared daily goal and participated in morning stretch/exercise. Identified goal as \" Maintain Hope\".

## 2018-09-05 PROCEDURE — 6360000002 HC RX W HCPCS: Performed by: NURSE PRACTITIONER

## 2018-09-05 PROCEDURE — 6370000000 HC RX 637 (ALT 250 FOR IP): Performed by: NURSE PRACTITIONER

## 2018-09-05 PROCEDURE — 6370000000 HC RX 637 (ALT 250 FOR IP): Performed by: PSYCHIATRY & NEUROLOGY

## 2018-09-05 PROCEDURE — 1240000000 HC EMOTIONAL WELLNESS R&B

## 2018-09-05 RX ORDER — QUETIAPINE 200 MG/1
200 TABLET, FILM COATED, EXTENDED RELEASE ORAL NIGHTLY
Status: DISCONTINUED | OUTPATIENT
Start: 2018-09-05 | End: 2018-09-06

## 2018-09-05 RX ADMIN — ATORVASTATIN CALCIUM 20 MG: 20 TABLET, FILM COATED ORAL at 09:02

## 2018-09-05 RX ADMIN — VENLAFAXINE HYDROCHLORIDE 37.5 MG: 37.5 CAPSULE, EXTENDED RELEASE ORAL at 09:02

## 2018-09-05 RX ADMIN — ENOXAPARIN SODIUM 30 MG: 30 INJECTION SUBCUTANEOUS at 21:01

## 2018-09-05 RX ADMIN — QUETIAPINE FUMARATE 200 MG: 200 TABLET, EXTENDED RELEASE ORAL at 21:04

## 2018-09-05 RX ADMIN — Medication 2000 UNITS: at 09:02

## 2018-09-05 RX ADMIN — ENOXAPARIN SODIUM 30 MG: 30 INJECTION SUBCUTANEOUS at 09:03

## 2018-09-05 RX ADMIN — HYDROXYZINE PAMOATE 50 MG: 50 CAPSULE ORAL at 01:37

## 2018-09-05 RX ADMIN — LEVOTHYROXINE SODIUM 25 MCG: 25 TABLET ORAL at 09:03

## 2018-09-05 ASSESSMENT — PAIN SCALES - GENERAL
PAINLEVEL_OUTOF10: 0
PAINLEVEL_OUTOF10: 0

## 2018-09-05 ASSESSMENT — PAIN DESCRIPTION - PAIN TYPE: TYPE: ACUTE PAIN

## 2018-09-05 NOTE — PROGRESS NOTES
Patient attended community meeting. Patient identified daily goal as to work through the pain in my leg. Group Therapy Note    Date: 9/5/2018  Start Time: 10:00  End Time:  10:45  Number of Participants: 4    Type of Group: Psychoeducation    Wellness Binder Information  Module Name:  Coping Skills  Session Number: NA    Patient's Goal: To identify coping skills. Notes: Attended group was able to discuss coping with pain of surgery. Status After Intervention:  Unchanged    Participation Level:  Active Listener    Participation Quality: Attentive      Speech:  hesitant      Thought Process/Content: Logical      Affective Functioning: Flat      Mood: anxious and depressed      Level of consciousness:  Alert      Response to Learning: Progressing to goal      Endings: None Reported    Modes of Intervention: Education      Discipline Responsible: Psychoeducational Specialist      Signature:  RONAL Skinner

## 2018-09-05 NOTE — PROGRESS NOTES
Group Therapy Note  Date: 9/5/2018  Start Time: 1:15  End Time:  2:00  Number of Participants: 3     Type of Group: Cognitive Skills     Wellness Binder Information  Module Name: Daily Maintenance  Session Number:  9-1     Patient's Goal:  To identify a positive routine with positive coping skills to use on a daily basis.     Notes: Attended group and was able to verbalize about coping and need to take care of self better    Status After Intervention:  Unchanged    Participation Level:  Active Listener and Interactive    Participation Quality: Attentive and Sharing      Speech:  hesitant      Thought Process/Content: Logical      Affective Functioning: Flat      Mood: anxious and depressed      Level of consciousness:  Alert      Response to Learning: Progressing to goal      Endings: None Reported    Modes of Intervention: Education      Discipline Responsible: Psychoeducational Specialist      Signature:  RONAL Cherry

## 2018-09-05 NOTE — PLAN OF CARE
Problem: Altered Mood, Depressive Behavior:  Goal: Able to verbalize support systems  Able to verbalize support systems   Outcome: Ongoing    Goal: Absence of self-harm  Absence of self-harm   Outcome: Ongoing      Comments: Patient denies SI, HI, and hallucinations. Patient admits to feeling anxious and depressed. When asked to rate his anxiety and depression on a scale of 0-10, the patient stated \"it's pretty up there\". Patient states \"I don't know what I was thinking and what I was doing with my life. I lost my mind being in here. I had a chance to get cleaned up earlier and I blew it\". Patient was given bath wipes and a clean gown. PRN Vistaril was offered to patient, but patient was indecisive. Will re-evaluate. No unit problems reported. Will continue to observe and support.

## 2018-09-06 PROCEDURE — 6370000000 HC RX 637 (ALT 250 FOR IP): Performed by: NURSE PRACTITIONER

## 2018-09-06 PROCEDURE — 6370000000 HC RX 637 (ALT 250 FOR IP): Performed by: PSYCHIATRY & NEUROLOGY

## 2018-09-06 PROCEDURE — 1240000000 HC EMOTIONAL WELLNESS R&B

## 2018-09-06 PROCEDURE — 6360000002 HC RX W HCPCS: Performed by: NURSE PRACTITIONER

## 2018-09-06 PROCEDURE — 99231 SBSQ HOSP IP/OBS SF/LOW 25: CPT | Performed by: PSYCHIATRY & NEUROLOGY

## 2018-09-06 RX ORDER — QUETIAPINE 150 MG/1
300 TABLET, FILM COATED, EXTENDED RELEASE ORAL NIGHTLY
Status: DISCONTINUED | OUTPATIENT
Start: 2018-09-06 | End: 2018-09-08

## 2018-09-06 RX ORDER — QUETIAPINE FUMARATE 25 MG/1
25 TABLET, FILM COATED ORAL ONCE
Status: COMPLETED | OUTPATIENT
Start: 2018-09-06 | End: 2018-09-06

## 2018-09-06 RX ADMIN — ENOXAPARIN SODIUM 30 MG: 30 INJECTION SUBCUTANEOUS at 09:20

## 2018-09-06 RX ADMIN — QUETIAPINE FUMARATE 25 MG: 25 TABLET ORAL at 09:30

## 2018-09-06 RX ADMIN — VENLAFAXINE HYDROCHLORIDE 37.5 MG: 37.5 CAPSULE, EXTENDED RELEASE ORAL at 09:19

## 2018-09-06 RX ADMIN — HYDROXYZINE PAMOATE 50 MG: 50 CAPSULE ORAL at 18:28

## 2018-09-06 RX ADMIN — LEVOTHYROXINE SODIUM 25 MCG: 25 TABLET ORAL at 09:20

## 2018-09-06 RX ADMIN — ENOXAPARIN SODIUM 30 MG: 30 INJECTION SUBCUTANEOUS at 20:51

## 2018-09-06 RX ADMIN — QUETIAPINE FUMARATE 300 MG: 150 TABLET, EXTENDED RELEASE ORAL at 20:50

## 2018-09-06 RX ADMIN — ACETAMINOPHEN 650 MG: 325 TABLET, FILM COATED ORAL at 09:20

## 2018-09-06 RX ADMIN — ATORVASTATIN CALCIUM 20 MG: 20 TABLET, FILM COATED ORAL at 09:20

## 2018-09-06 RX ADMIN — Medication 2000 UNITS: at 09:19

## 2018-09-06 ASSESSMENT — PAIN SCALES - GENERAL
PAINLEVEL_OUTOF10: 0
PAINLEVEL_OUTOF10: 2
PAINLEVEL_OUTOF10: 3

## 2018-09-06 ASSESSMENT — PAIN DESCRIPTION - LOCATION: LOCATION: LEG

## 2018-09-06 ASSESSMENT — PAIN DESCRIPTION - ORIENTATION: ORIENTATION: LEFT

## 2018-09-06 ASSESSMENT — PAIN DESCRIPTION - FREQUENCY: FREQUENCY: CONTINUOUS

## 2018-09-06 ASSESSMENT — PAIN DESCRIPTION - ONSET: ONSET: ON-GOING

## 2018-09-06 ASSESSMENT — PAIN DESCRIPTION - DESCRIPTORS: DESCRIPTORS: ACHING;NAGGING;THROBBING

## 2018-09-06 ASSESSMENT — PAIN DESCRIPTION - PAIN TYPE: TYPE: ACUTE PAIN

## 2018-09-06 NOTE — PROGRESS NOTES
DATE OF SERVICE:     9/5/2018    Patient chief complaint today is:             [x] Depression      [x] Anxiety        [] Psychosis         [x] Suicidal/Homicidal                         [] Delusions           [] Aggression          Cam Graces seen today for the purpose of continuation of care. Nursing, social work reports, laboratory studies and vital signs are reviewed. Subjective: Today patient states that he is didn't sleep last night. Anxiety is constant and severe. Unable to communicate needs. Was up in wheelchair today on the unit and encouraged him to call family on the telephone regarding the care of his brother for reassurance. Sleep:  [] Good [] Fair  [x] Poor  Appetite:  [] Good [x] Fair  [] Poor    Depression:  [] Mild [] Moderate [x] Severe                [x] Constant [] Sporadic     Anxiety: [] Mild [] Moderate [x] Severe    [x] Constant [] Sporadic     Delusions: [] Mild [] Moderate [x] Severe     [x] Constant [] Sporadic     [x] Paranoid [] Somatic [] Grandiose     Hallucinations: [] Mild [] Moderate [] Severe     [] Constant [] Sporadic    [] Auditory  [] Visual [] Tactile       Suicidal: [] Constant [] Sporadic  Homicidal: [] Constant [] Sporadic    Unscheduled Medications     [] Patient Receiving Emergency Medications \" Chemical Restraint\"   [] Requesting PRN medications for anxiety    Medical Review of Systems:     All other than marked systmes have been reviewed and are all negative.     Constitutional Symptoms: []  fever []  Chills  Skin Symptoms: [] rash []  Pruritus   Eye Symptoms: [] Vision unchanged []  recent vision problems[] blurred vision   Respiratory Symptoms:[] shortness of breath [] cough  Cardiovascular Symptoms:  [] chest pain   [] palpitations   Gastrointestinal Symptoms: []  abdominal pain []  nausea []  vomiting []  diarrhea  Genitourinary Symptoms: []  dysuria  []  hematuria   Musculoskeletal Symptoms: []  back pain []  muscle pain []  joint

## 2018-09-06 NOTE — PLAN OF CARE
Problem: Falls - Risk of:  Goal: Will remain free from falls  Will remain free from falls   Outcome: Met This Shift  Pt resting in bed with eyes closed, no observed abnormalities. Close observations continue. Bed alarm on.

## 2018-09-06 NOTE — PLAN OF CARE
Problem: Altered Mood, Depressive Behavior:  Goal: Participates in care planning  Participates in care planning   Outcome: Met This Shift  Attended groups tonight.

## 2018-09-06 NOTE — PLAN OF CARE
Problem: Altered Mood, Depressive Behavior:  Goal: Able to verbalize acceptance of life and situations over which he or she has no control  Able to verbalize acceptance of life and situations over which he or she has no control   Outcome: Ongoing    Goal: Absence of self-harm  Absence of self-harm   Outcome: Ongoing    Patient up and in wheelchair most of shift. Affect sad and anxious. Fair eye contact. Perseverates on \"mistakes I've made, I don't take care of myself, it's going to catch up with me. Mordecai [place] Mordecai [place] \" Exhibits difficulty making decisions. Psychosocial support given. Positive and appropriate interactions with staff and peers. Calm and cooperative with staff and care. Accepting of meds and meals. [place] brace on left leg. Transfers safely from bed to wheelchair. Does not use walker. Uses urinal with min assist from staff to empty. Rates left leg pain from 2-3 this shift. Denies thoughts or intent to harm self or others at this time. Denies audio or visual hallucinations at this time. Reports no concerns or complaints, no signs or symptoms of distress or discomfort. Able to maintain control of behaviors and emotions. Patient is encouraged to continue to work towards discharge goal by complying with medications, attending groups and to seek staff if feelings are overwhelming. Environmental rounds completed per unit policy to maintain safety of everyone on the unit. Staff will offer support and interventions as requested or required.
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
## 2018-09-06 NOTE — PROGRESS NOTES
Symptoms: []  headache []  dizziness  Hematolymphoid Symptoms: [] Adenopathy [] Bruises   [] Schimosis       Psychiatric Review of systems  Delusions:  [] Denies [] Endorses   Withdrawals:  [] Denies [] Endorses    Hallucinations: [] Denies [] Endorses    Extra Pyramidal Symptoms: [] Denies [] Endorses      /83   Pulse 72   Temp 97.4 °F (36.3 °C) (Temporal)   Resp 16   Ht 5' 9\" (1.753 m)   Wt 220 lb (99.8 kg)   SpO2 97%   BMI 32.49 kg/m²     Mental Status Examination:    Cognition:      [x] Alert  [] Awake  [x] Oriented  [x] Person  [x] Place [x] Time      [] drowsy  [] tired  [] lethargic  [] distractable  [] Other     Attention/Concentration:   [x] Attentive  [] Distracted        Memory Recent and Remote: [x] Intact   [] Impaired [] Partially Impaired     Language: [] Able to recognize and name objects          [] Unable to recognize and name Objects    Fund of Knowledge:  [] Poor []  Fair  [] Good    Speech: [] Normal  [x] Soft  [] Slow  [x] Fast [] Pressured            [] Loud [] Dysarthria  [] Incoherent    Appearance: [] Well Groomed  [] Casual Dressed  [x] Unkept  [] Disheveled          [] Normal weight[] Thin  [] Overweight  [] Obese           Attitude: [] Positive  [] Hostile  [] Demanding  [] Guarded  [] Defensive         [x] Cooperative  []  Uncooperative      Behavior:  [] Normal Gait  [] Walks with Assistance  [] Angela Chair    [] Walks with Philip [place]  [] In Hospital Bed  [x] Sitting in Chair    Muscle-Skeletal:  [x] Normal Muscle Tone [] Muscle Atrophy       [] Abnormal Muscle Movement     Eye Contact:  [] Good eye contact  [x] Intermittent Eye Contact  [] Poor Eye Contact     Mood: [x] Depressed  [x] Anxious  [] Irritated  [] Euthymic   [] Angry [] Restless    Affect:  [] Congruent  [] Incongruent  [] Labile  [x] Constricted  [] Flat  [] Bizarre     Thought Process and Association:  [] Logical [] Illogical       [] Linear and Goal Directed  [x] Tangential  [] Circumstantial     Thought Content:  [] Denies [] Endorses [] Suicidal [] Homicidal  [x] Delusional      [x] Paranoid  [] Somatic  [] Grandiose    Perception: [x]  None  [] Auditory   [] Visual  [] tactile   [] olfactory  [] Illusions         Insight: [] Intact  [] Fair  [x] Limited    Judgement:  [] Intact  [] Fair  [x] Limited      Assessment/Plan:        Patient Active Problem List   Diagnosis Code    Lymphedema of leg I89.0    Acquired hypothyroidism E03.9    Acquired hypothyroidism E03.9    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    Abnormal prostate exam R39.89    Disorder of bone M89.9    Fatigue R53.83    Hyperlipidemia E78.5    Hypothyroid E03.9    Psoriasis L40.9    Reflux esophagitis K21.0    History of surgery Z98.890    MDD (major depressive disorder), single episode F32.9    Major depressive disorder, severe ([facility] [address] F32.2         Plan:    []  Patient is refusing medications  [x] Improving as expected   [] Not improving as expected   [] Worsening    []  At Baseline       Reason for more than one antipsychotic:  [x] N/A  [] 3 failed monotherapy(drugs tried):  [] Cross over to a new antipsychotic  [] Taper to monotherapy from polypharmacy  [] Augmentation of Clozapine therapy due to treatment resistance to single therapy      Signed:  Warden Arguelles  9/6/2018  11:52 AM

## 2018-09-07 PROCEDURE — 6370000000 HC RX 637 (ALT 250 FOR IP): Performed by: NURSE PRACTITIONER

## 2018-09-07 PROCEDURE — 6370000000 HC RX 637 (ALT 250 FOR IP): Performed by: PSYCHIATRY & NEUROLOGY

## 2018-09-07 PROCEDURE — 99231 SBSQ HOSP IP/OBS SF/LOW 25: CPT | Performed by: PSYCHIATRY & NEUROLOGY

## 2018-09-07 PROCEDURE — 1240000000 HC EMOTIONAL WELLNESS R&B

## 2018-09-07 PROCEDURE — 6360000002 HC RX W HCPCS: Performed by: NURSE PRACTITIONER

## 2018-09-07 RX ADMIN — VENLAFAXINE HYDROCHLORIDE 37.5 MG: 37.5 CAPSULE, EXTENDED RELEASE ORAL at 09:59

## 2018-09-07 RX ADMIN — LEVOTHYROXINE SODIUM 25 MCG: 25 TABLET ORAL at 09:59

## 2018-09-07 RX ADMIN — ENOXAPARIN SODIUM 30 MG: 30 INJECTION SUBCUTANEOUS at 20:56

## 2018-09-07 RX ADMIN — ENOXAPARIN SODIUM 30 MG: 30 INJECTION SUBCUTANEOUS at 09:59

## 2018-09-07 RX ADMIN — Medication 2000 UNITS: at 16:56

## 2018-09-07 RX ADMIN — ATORVASTATIN CALCIUM 20 MG: 20 TABLET, FILM COATED ORAL at 09:59

## 2018-09-07 ASSESSMENT — PAIN SCALES - GENERAL: PAINLEVEL_OUTOF10: 0

## 2018-09-07 NOTE — PROGRESS NOTES
DATE OF SERVICE:     9/7/2018    Gabe Second seen today for the purpose of continuation of care. Nursing, social work reports, laboratory studies and vital signs are reviewed. Patient chief complaint today is:               [x] Depression      [x] Anxiety        [] Psychosis         [x] Suicidal/Homicidal                         [] Delusions           [] Aggression          Subjective: Today patient states that \"no energy. \"  Out on the unit today. Attending groups. Feels hungry, tired, hopeless and helpless. Remains anxious, suicidal and paranoid. Talked about his brother and said I should call my brother. Support given. Sleep:  [] Good [] Fair  [x] Poor  Appetite:  [] Good [x] Fair  [] Poor    Depression:  [] Mild [] Moderate [x] Severe                [x] Constant [] Sporadic     Anxiety: [] Mild [] Moderate [x] Severe    [x] Constant [] Sporadic     Delusions: [] Mild [] Moderate [x] Severe     [x] Constant [] Sporadic     [x] Paranoid [] Somatic [] Grandiose     Hallucinations: [] Mild [] Moderate [] Severe     [] Constant [] Sporadic    [] Auditory  [] Visual [] Tactile       Suicidal: [x] Constant [] Sporadic  Homicidal: [] Constant [] Sporadic    Unscheduled Medications     [] Patient Receiving Emergency Medications \" Chemical Restraint\"   [] Requesting PRN medications for anxiety    Medical Review of Systems:     All other than marked systmes have been reviewed and are all negative.     Constitutional Symptoms: []  fever []  Chills  Skin Symptoms: [] rash []  Pruritus   Eye Symptoms: [] Vision unchanged []  recent vision problems[] blurred vision   Respiratory Symptoms:[] shortness of breath [] cough  Cardiovascular Symptoms:  [] chest pain   [] palpitations   Gastrointestinal Symptoms: []  abdominal pain []  nausea []  vomiting []  diarrhea  Genitourinary Symptoms: []  dysuria  []  hematuria   Musculoskeletal Symptoms: []  back pain []  muscle pain []  joint pain  Neurologic Symptoms: [] headache []  dizziness  Hematolymphoid Symptoms: [] Adenopathy [] Bruises   [] Schimosis       Psychiatric Review of systems  Delusions:  [] Denies [] Endorses   Withdrawals:  [] Denies [] Endorses    Hallucinations: [] Denies [] Endorses    Extra Pyramidal Symptoms: [] Denies [] Endorses      BP (!) 161/110   Pulse 110   Temp 98.3 °F (36.8 °C) (Temporal)   Resp 18   Ht 5' 9\" (1.753 m)   Wt 220 lb (99.8 kg)   SpO2 97%   BMI 32.49 kg/m²     Mental Status Examination:    Cognition:      [x] Alert  [] Awake  [x] Oriented  [x] Person  [x] Place [x] Time      [] drowsy  [x] tired  [] lethargic  [] distractable  [] Other     Attention/Concentration:   [x] Attentive  [] Distracted        Memory Recent and Remote: [x] Intact   [] Impaired [] Partially Impaired     Language: [] Able to recognize and name objects          [] Unable to recognize and name Objects    Fund of Knowledge:  [] Poor []  Fair  [] Good    Speech: [] Normal  [x] Soft  [] Slow  [x] Fast [x] Pressured            [] Loud [] Dysarthria  [] Incoherent    Appearance: [] Well Groomed  [] Casual Dressed  [x] Unkept  [] Disheveled          [] Normal weight[] Thin  [] Overweight  [x] Obese           Attitude: [] Positive  [] Hostile  [] Demanding  [] Guarded  [] Defensive         [x] Cooperative  []  Uncooperative      Behavior:  [] Normal Gait  [] Walks with Assistance  [] Angela Chair    [] Walks with Dylan Campbell  [] In Hospital Bed  [x] Sitting in Chair    Muscle-Skeletal:  [x] Normal Muscle Tone [] Muscle Atrophy       [] Abnormal Muscle Movement     Eye Contact:  [x] Good eye contact  [] Intermittent Eye Contact  [] Poor Eye Contact     Mood: [x] Depressed  [x] Anxious  [] Irritated  [] Euthymic   [] Angry [] Restless    Affect:  [] Congruent  [] Incongruent  [] Labile  [x] Constricted  [] Flat  [] Bizarre     Thought Process and Association:  [] Logical [] Illogical       [] Linear and Goal Directed  [x] Tangential  [] Circumstantial     Thought Content: [] Denies [x] Endorses [x] Suicidal [] Homicidal  [x] Delusional      [x] Paranoid  [] Somatic  [] Grandiose    Perception: [x]  None  [] Auditory   [] Visual  [] tactile   [] olfactory  [] Illusions         Insight: [] Intact  [] Fair  [x] Limited    Judgement:  [] Intact  [] Fair  [x] Limited      Assessment/Plan:        Patient Active Problem List   Diagnosis Code    Lymphedema of leg I89.0    Acquired hypothyroidism E03.9    Acquired hypothyroidism E03.9    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    Abnormal prostate exam R39.89    Disorder of bone M89.9    Fatigue R53.83    Hyperlipidemia E78.5    Hypothyroid E03.9    Psoriasis L40.9    Reflux esophagitis K21.0    History of surgery Z98.890    MDD (major depressive disorder), single episode F32.9    Major depressive disorder, severe ([facility] [address] F32.2         Plan:    []  Patient is refusing medications  [x] Improving as expected   [] Not improving as expected   [] Worsening    []  At Baseline       Reason for more than one antipsychotic:  [x] N/A  [] 3 failed monotherapy(drugs tried):  [] Cross over to a new antipsychotic  [] Taper to monotherapy from polypharmacy  [] Augmentation of Clozapine therapy due to treatment resistance to single therapy      Signed:  Dieudonne Walter  9/7/2018  3:30 PM

## 2018-09-07 NOTE — PROGRESS NOTES
Pt in room, tearful, depressed. Reports anxiety. Pt states, \"You know, I said some thing and done some things\". Does not elaborate further. PRN Vistaril was offered and refused. Pt unsure where he will be discharged to and seems worried about it. Support offered. Pt states \"I should not have came back here they want to send me rehab\". Pt seems indesicive about things. Tough for him to make small decisions such as changing clothing or getting in and out of bed. Extra time needed randi provided.

## 2018-09-07 NOTE — PLAN OF CARE
Problem: Altered Mood, Depressive Behavior:  Goal: Able to verbalize and/or display a decrease in depressive symptoms  Able to verbalize and/or display a decrease in depressive symptoms   Outcome: Ongoing  Pt resting in bed with eyes closed, no observed abnormalities. Close observations continue.

## 2018-09-08 LAB — CANNABINOIDS CONF, URINE: 51 NG/ML

## 2018-09-08 PROCEDURE — 6370000000 HC RX 637 (ALT 250 FOR IP): Performed by: PSYCHIATRY & NEUROLOGY

## 2018-09-08 PROCEDURE — 6360000002 HC RX W HCPCS: Performed by: NURSE PRACTITIONER

## 2018-09-08 PROCEDURE — 1240000000 HC EMOTIONAL WELLNESS R&B

## 2018-09-08 PROCEDURE — 99232 SBSQ HOSP IP/OBS MODERATE 35: CPT | Performed by: PSYCHIATRY & NEUROLOGY

## 2018-09-08 PROCEDURE — 6370000000 HC RX 637 (ALT 250 FOR IP): Performed by: NURSE PRACTITIONER

## 2018-09-08 RX ORDER — DIVALPROEX SODIUM 500 MG/1
1000 TABLET, EXTENDED RELEASE ORAL DAILY
Status: DISCONTINUED | OUTPATIENT
Start: 2018-09-08 | End: 2018-09-09

## 2018-09-08 RX ORDER — QUETIAPINE FUMARATE 300 MG/1
300 TABLET, FILM COATED ORAL EVERY EVENING
Status: DISCONTINUED | OUTPATIENT
Start: 2018-09-08 | End: 2018-09-08

## 2018-09-08 RX ADMIN — ATORVASTATIN CALCIUM 20 MG: 20 TABLET, FILM COATED ORAL at 10:25

## 2018-09-08 RX ADMIN — Medication 2000 UNITS: at 10:25

## 2018-09-08 RX ADMIN — VENLAFAXINE HYDROCHLORIDE 37.5 MG: 37.5 CAPSULE, EXTENDED RELEASE ORAL at 10:25

## 2018-09-08 RX ADMIN — QUETIAPINE FUMARATE 400 MG: 100 TABLET ORAL at 17:01

## 2018-09-08 RX ADMIN — LEVOTHYROXINE SODIUM 25 MCG: 25 TABLET ORAL at 10:25

## 2018-09-08 RX ADMIN — ENOXAPARIN SODIUM 30 MG: 30 INJECTION SUBCUTANEOUS at 10:30

## 2018-09-08 RX ADMIN — ENOXAPARIN SODIUM 30 MG: 30 INJECTION SUBCUTANEOUS at 22:02

## 2018-09-08 ASSESSMENT — PAIN SCALES - GENERAL: PAINLEVEL_OUTOF10: 0
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
## 2018-09-08 NOTE — PLAN OF CARE
Problem: Altered Mood, Depressive Behavior:  Goal: Able to verbalize and/or display a decrease in depressive symptoms  Able to verbalize and/or display a decrease in depressive symptoms   Outcome: Ongoing    Goal: Absence of self-harm  Absence of self-harm   Outcome: Ongoing  Pt is stable and alert. Pt denies homicidal ideations. Pt sates he feels suicidal, denies plan. Denies hallucinations. Pt has a flat affect, poor eye contact, has delayed responses and appears to have low motivational baseline. Pt reports not being sure of any goals. Will follow and monitor.

## 2018-09-08 NOTE — PROGRESS NOTES
This nurse was giving the patient his evening seroquel. Patient had the pill in his mouth and stated \"I don't think I can swallow it. I have been having a hard time swallowing pills while I've been here. \"  This nurse encouraged patient to try to swallow pill, offered patient applesauce and pudding to put the pill in. The patient began crying and after 10 minutes of trying to swallow the pill, he spit it out and stated \"I can't swallow it. It won't go down for some reason. I don't know what to do. I don't think I can take this tonight. \"  This nurse repeatedly offered different ways for patient to take the pill and he kept crying and stating \"I just can't do it. \"

## 2018-09-09 PROCEDURE — 6370000000 HC RX 637 (ALT 250 FOR IP): Performed by: NURSE PRACTITIONER

## 2018-09-09 PROCEDURE — 1240000000 HC EMOTIONAL WELLNESS R&B

## 2018-09-09 PROCEDURE — 6360000002 HC RX W HCPCS: Performed by: NURSE PRACTITIONER

## 2018-09-09 PROCEDURE — 99232 SBSQ HOSP IP/OBS MODERATE 35: CPT | Performed by: PSYCHIATRY & NEUROLOGY

## 2018-09-09 RX ORDER — RISPERIDONE 0.5 MG/1
1 TABLET, ORALLY DISINTEGRATING ORAL 2 TIMES DAILY
Status: DISCONTINUED | OUTPATIENT
Start: 2018-09-09 | End: 2018-09-14

## 2018-09-09 RX ADMIN — ENOXAPARIN SODIUM 30 MG: 30 INJECTION SUBCUTANEOUS at 10:11

## 2018-09-09 RX ADMIN — Medication 2000 UNITS: at 10:10

## 2018-09-09 RX ADMIN — ENOXAPARIN SODIUM 30 MG: 30 INJECTION SUBCUTANEOUS at 20:42

## 2018-09-09 RX ADMIN — ATORVASTATIN CALCIUM 20 MG: 20 TABLET, FILM COATED ORAL at 10:09

## 2018-09-09 RX ADMIN — LEVOTHYROXINE SODIUM 25 MCG: 25 TABLET ORAL at 10:10

## 2018-09-09 ASSESSMENT — PAIN SCALES - GENERAL
PAINLEVEL_OUTOF10: 0

## 2018-09-09 NOTE — PLAN OF CARE
Problem: Altered Mood, Depressive Behavior:  Goal: Able to verbalize and/or display a decrease in depressive symptoms  Able to verbalize and/or display a decrease in depressive symptoms   Outcome: Ongoing    Goal: Ability to disclose and discuss suicidal ideas will improve  Ability to disclose and discuss suicidal ideas will improve   Outcome: Ongoing  Pt remained stable, remains very flat and without increased energy, pt requires a lot of coaxing to take meds, did not take depakote, did take other meds. Pt is not violent. Eye contact is poor, speech is soft and responses to questions are short and sporadic with only a few words spoken. Pt skin warm/dry. hygiene encouraged. Pt appears to be lacking motivation. Will follow and monitor.

## 2018-09-09 NOTE — PROGRESS NOTES
headache []  dizziness  Hematolymphoid Symptoms: [] Adenopathy [] Bruises   [] Schimosis       Psychiatric Review of systems  Delusions:  [] Denies [] Endorses   Withdrawals:  [] Denies [] Endorses    Hallucinations: [] Denies [] Endorses    Extra Pyramidal Symptoms: [] Denies [] Endorses      /81   Pulse 109   Temp 97.4 °F (36.3 °C) (Temporal)   Resp 18   Ht 5' 9\" (1.753 m)   Wt 220 lb (99.8 kg)   SpO2 97%   BMI 32.49 kg/m²     Mental Status Examination:    Cognition:      [x] Alert  [] Awake  [x] Oriented  [x] Person  [x] Place [x] Time      [] drowsy  [x] tired  [] lethargic  [] distractable  [] Other     Attention/Concentration:   [x] Attentive  [] Distracted        Memory Recent and Remote: [x] Intact   [] Impaired [] Partially Impaired     Language: [] Able to recognize and name objects          [] Unable to recognize and name Objects    Fund of Knowledge:  [] Poor []  Fair  [] Good    Speech: [] Normal  [x] Soft  [] Slow  [x] Fast [x] Pressured            [] Loud [] Dysarthria  [] Incoherent    Appearance: [] Well Groomed  [] Casual Dressed  [x] Unkept  [] Disheveled          [] Normal weight[] Thin  [] Overweight  [x] Obese           Attitude: [] Positive  [] Hostile  [] Demanding  [] Guarded  [] Defensive         [x] Cooperative  []  Uncooperative      Behavior:  [] Normal Gait  [] Walks with Assistance  [] Angela Chair    [x] Walks with [place]  [] In Hospital Bed  [] Sitting in Chair    Muscle-Skeletal:  [x] Normal Muscle Tone [] Muscle Atrophy       [] Abnormal Muscle Movement     Eye Contact:  [] Good eye contact  [x] Intermittent Eye Contact  [] Poor Eye Contact     Mood: [x] Depressed  [x] Anxious  [] Irritated  [] Euthymic   [] Angry [] Restless    Affect:  [] Congruent  [] Incongruent  [] Labile  [x] Constricted  [] Flat  [] Bizarre     Thought Process and Association:  [] Logical [] Illogical       [] Linear and Goal Directed  [x] Tangential  [] Circumstantial     Thought Content:  [] Denies [x] Endorses [x] Suicidal [] Homicidal  [x] Delusional      [x] Paranoid  [] Somatic  [] Grandiose    Perception: [x]  None  [] Auditory   [] Visual  [] tactile   [] olfactory  [] Illusions         Insight: [] Intact  [] Fair  [x] Limited    Judgement:  [] Intact  [] Fair  [x] Limited      Assessment/Plan:        Patient Active Problem List   Diagnosis Code    Lymphedema of leg I89.0    Acquired hypothyroidism E03.9    Acquired hypothyroidism E03.9    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    Abnormal prostate exam R39.89    Disorder of bone M89.9    Fatigue R53.83    Hyperlipidemia E78.5    Hypothyroid E03.9    Psoriasis L40.9    Reflux esophagitis K21.0    History of surgery Z98.890    MDD (major depressive disorder), single episode F32.9    Major depressive disorder, severe ([facility] [address] F32.2         Plan:    []  Patient is refusing medications  [x] Improving as expected   [] Not improving as expected   [] Worsening    []  At Baseline       Reason for more than one antipsychotic:  [x] N/A  [] 3 failed monotherapy(drugs tried):  [] Cross over to a new antipsychotic  [] Taper to monotherapy from polypharmacy  [] Augmentation of Clozapine therapy due to treatment resistance to single therapy    D/C effexor, Increase seroquel to 400 mg q pm    Signed:  Tami Chong  9/8/2018  9:42 PM

## 2018-09-09 NOTE — PLAN OF CARE
Problem: Altered Mood, Depressive Behavior:  Goal: Able to verbalize acceptance of life and situations over which he or she has no control  Able to verbalize acceptance of life and situations over which he or she has no control   Outcome: Ongoing    Goal: Able to verbalize support systems  Able to verbalize support systems   Outcome: Ongoing      Comments: Patient asleep in room. No signs of distress or discomfort. No PRN medications given at this time. Safety needs met. No unit problems reported. Will continue to observe and support.

## 2018-09-09 NOTE — PROGRESS NOTES
Patient declined to attend community meeting.  Attempted to have patient identify goal for the day but patient stated: \"no\"

## 2018-09-09 NOTE — PROGRESS NOTES
Patient denies SI, HI, and hallucinations. Patient appears anxious but cooperative at this time. Patient is on the unit more and socializing with peers. Lovenox injection taken without issue. Patient refused PRN Trazodone. Patient states that he should have done more today and is concerned about being discharged. Will continue to observe and support.

## 2018-09-09 NOTE — PROGRESS NOTES
DATE OF SERVICE:     9/9/2018    Lawrence Amor seen today for the purpose of continuation of care. Nursing, social work reports, laboratory studies and vital signs are reviewed. Patient chief complaint today is:               [x] Depression      [x] Anxiety        [] Psychosis         [x] Suicidal/Homicidal                         [] Delusions           [] Aggression          Subjective: Today patient continues to perseverate. Remains severely anxious, depressed, suicidal and paranoid. Exhibits BADL deficit in bathing. Attending groups. Sleep:  [] Good [] Fair  [x] Poor  Appetite:  [] Good [x] Fair  [] Poor    Depression:  [] Mild [] Moderate [x] Severe                [x] Constant [] Sporadic     Anxiety: [] Mild [] Moderate [x] Severe    [x] Constant [] Sporadic     Delusions: [] Mild [] Moderate [x] Severe     [x] Constant [] Sporadic     [x] Paranoid [] Somatic [] Grandiose     Hallucinations: [] Mild [] Moderate [] Severe     [] Constant [] Sporadic    [] Auditory  [] Visual [] Tactile       Suicidal: [x] Constant [] Sporadic  Homicidal: [] Constant [] Sporadic    Unscheduled Medications     [] Patient Receiving Emergency Medications \" Chemical Restraint\"   [] Requesting PRN medications for anxiety    Medical Review of Systems:     All other than marked systmes have been reviewed and are all negative.     Constitutional Symptoms: []  fever []  Chills  Skin Symptoms: [] rash []  Pruritus   Eye Symptoms: [] Vision unchanged []  recent vision problems[] blurred vision   Respiratory Symptoms:[] shortness of breath [] cough  Cardiovascular Symptoms:  [] chest pain   [] palpitations   Gastrointestinal Symptoms: []  abdominal pain []  nausea []  vomiting []  diarrhea  Genitourinary Symptoms: []  dysuria  []  hematuria   Musculoskeletal Symptoms: []  back pain []  muscle pain []  joint pain  Neurologic Symptoms: []  headache []  dizziness  Hematolymphoid Symptoms: [] Adenopathy [] Bruises   [] Schimosis       Psychiatric Review of systems  Delusions:  [] Denies [] Endorses   Withdrawals:  [] Denies [] Endorses    Hallucinations: [] Denies [] Endorses    Extra Pyramidal Symptoms: [] Denies [] Endorses      BP (!) 142/87   Pulse 89   Temp 98.3 °F (36.8 °C) (Temporal)   Resp 20   Ht 5' 9\" (1.753 m)   Wt 220 lb (99.8 kg)   SpO2 97%   BMI 32.49 kg/m²     Mental Status Examination:    Cognition:      [x] Alert  [] Awake  [x] Oriented  [x] Person  [x] Place [x] Time      [] drowsy  [x] tired  [] lethargic  [] distractable  [] Other     Attention/Concentration:   [x] Attentive  [] Distracted        Memory Recent and Remote: [x] Intact   [] Impaired [] Partially Impaired     Language: [] Able to recognize and name objects          [] Unable to recognize and name Objects    Fund of Knowledge:  [] Poor []  Fair  [] Good    Speech: [] Normal  [x] Soft  [] Slow  [x] Fast [x] Pressured            [] Loud [] Dysarthria  [] Incoherent    Appearance: [] Well Groomed  [] Casual Dressed  [x] Unkept  [] Disheveled          [] Normal weight[] Thin  [] Overweight  [x] Obese           Attitude: [] Positive  [] Hostile  [] Demanding  [] Guarded  [] Defensive         [x] Cooperative  []  Uncooperative      Behavior:  [] Normal Gait  [] Walks with Assistance  [] Angela Chair    [x] Walks with [place]  [] In Hospital Bed  [] Sitting in Chair    Muscle-Skeletal:  [x] Normal Muscle Tone [] Muscle Atrophy       [] Abnormal Muscle Movement     Eye Contact:  [] Good eye contact  [x] Intermittent Eye Contact  [] Poor Eye Contact     Mood: [x] Depressed  [x] Anxious  [] Irritated  [] Euthymic   [] Angry [] Restless    Affect:  [] Congruent  [] Incongruent  [] Labile  [x] Constricted  [] Flat  [] Bizarre     Thought Process and Association:  [] Logical [] Illogical       [] Linear and Goal Directed  [x] Tangential  [] Circumstantial     Thought Content:  [] Denies [x] Endorses [x] Suicidal [] Homicidal  [x] Delusional      [x] Paranoid [] Somatic  [] Grandiose    Perception: [x]  None  [] Auditory   [] Visual  [] tactile   [] olfactory  [] Illusions         Insight: [] Intact  [] Fair  [x] Limited    Judgement:  [] Intact  [] Fair  [x] Limited      Assessment/Plan:        Patient Active Problem List   Diagnosis Code    Lymphedema of leg I89.0    Acquired hypothyroidism E03.9    Acquired hypothyroidism E03.9    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    Abnormal prostate exam R39.89    Disorder of bone M89.9    Fatigue R53.83    Hyperlipidemia E78.5    Hypothyroid E03.9    Psoriasis L40.9    Reflux esophagitis K21.0    History of surgery Z98.890    MDD (major depressive disorder), single episode F32.9    Major depressive disorder, severe ([facility] [address] F32.2         Plan:    []  Patient is refusing medications  [x] Improving as expected   [] Not improving as expected   [] Worsening    []  At Baseline     Increase Seroquel 400 mg at hs, Switch to Risperdal M-tabs disintegrating 1 mg BID, Switch to depakene 250mg/5cc TID. D/C depakote ER 1000 mg and effexor XR 37.5 mg.     Reason for more than one antipsychotic:  [x] N/A  [] 3 failed monotherapy(drugs tried):  [] Cross over to a new antipsychotic  [] Taper to monotherapy from polypharmacy  [] Augmentation of Clozapine therapy due to treatment resistance to single therapy.     Signed:  Winnie Eller  9/9/2018  4:25 PM

## 2018-09-10 PROCEDURE — 97530 THERAPEUTIC ACTIVITIES: CPT

## 2018-09-10 PROCEDURE — 1240000000 HC EMOTIONAL WELLNESS R&B

## 2018-09-10 PROCEDURE — 6370000000 HC RX 637 (ALT 250 FOR IP): Performed by: NURSE PRACTITIONER

## 2018-09-10 PROCEDURE — 6370000000 HC RX 637 (ALT 250 FOR IP): Performed by: PSYCHIATRY & NEUROLOGY

## 2018-09-10 PROCEDURE — 99232 SBSQ HOSP IP/OBS MODERATE 35: CPT | Performed by: PSYCHIATRY & NEUROLOGY

## 2018-09-10 RX ORDER — OLANZAPINE 10 MG/1
10 TABLET, ORALLY DISINTEGRATING ORAL NIGHTLY
Status: DISCONTINUED | OUTPATIENT
Start: 2018-09-10 | End: 2018-09-12

## 2018-09-10 RX ADMIN — Medication 2000 UNITS: at 09:52

## 2018-09-10 RX ADMIN — RISPERIDONE 0.5 MG: 0.5 TABLET, ORALLY DISINTEGRATING ORAL at 09:52

## 2018-09-10 RX ADMIN — ATORVASTATIN CALCIUM 20 MG: 20 TABLET, FILM COATED ORAL at 09:52

## 2018-09-10 RX ADMIN — LEVOTHYROXINE SODIUM 25 MCG: 25 TABLET ORAL at 09:52

## 2018-09-10 ASSESSMENT — PAIN SCALES - GENERAL: PAINLEVEL_OUTOF10: 0

## 2018-09-10 NOTE — PROGRESS NOTES
DATE OF SERVICE:     9/10/2018    Yefri Tan seen today for the purpose of continuation of care. Nursing, social work reports, laboratory studies and vital signs are reviewed. Patient chief complaint today is:             [] Depression      [] Anxiety        [x] Psychosis         [x] Suicidal/Homicidal                         [] Delusions           [] Aggression          Subjective: Today patient is disorganized, only taking certain medications. Patient remains paranoid and suicidal.      Sleep:  [] Good [] Fair  [x] Poor  Appetite:  [] Good [x] Fair  [] Poor    Depression:  [] Mild [] Moderate [x] Severe                [x] Constant [] Sporadic     Anxiety: [] Mild [] Moderate [x] Severe    [x] Constant [] Sporadic     Delusions: [] Mild [] Moderate [x] Severe     [x] Constant [] Sporadic     [x] Paranoid [] Somatic [] Grandiose     Hallucinations: [] Mild [] Moderate [] Severe     [] Constant [] Sporadic    [] Auditory  [] Visual [] Tactile       Suicidal: [] Constant [x] Sporadic  Homicidal: [] Constant [] Sporadic    Unscheduled Medications     [] Patient Receiving Emergency Medications \" Chemical Restraint\"   [] Requesting PRN medications for anxiety    Medical Review of Systems:     All other than marked systmes have been reviewed and are all negative.     Constitutional Symptoms: []  fever []  Chills  Skin Symptoms: [] rash []  Pruritus   Eye Symptoms: [] Vision unchanged []  recent vision problems[] blurred vision   Respiratory Symptoms:[] shortness of breath [] cough  Cardiovascular Symptoms:  [] chest pain   [] palpitations   Gastrointestinal Symptoms: []  abdominal pain []  nausea []  vomiting []  diarrhea  Genitourinary Symptoms: []  dysuria  []  hematuria   Musculoskeletal Symptoms: []  back pain []  muscle pain []  joint pain  Neurologic Symptoms: []  headache []  dizziness  Hematolymphoid Symptoms: [] Adenopathy [] Bruises   [] Schimosis       Psychiatric Review of systems  Delusions: Auditory   [] Visual  [] tactile   [] olfactory  [] Illusions         Insight: [] Intact  [] Fair  [x] Limited    Judgement:  [] Intact  [] Fair  [x] Limited      Assessment/Plan:        Patient Active Problem List   Diagnosis Code    Lymphedema of leg I89.0    Acquired hypothyroidism E03.9    Acquired hypothyroidism E03.9    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 Patient took some of his medications today, but not all. [] Improving as expected   [] Not improving as expected   [] Worsening    []  At Baseline       Reason for more than one antipsychotic:  [x] N/A  [] 3 failed monotherapy(drugs tried):  [] Cross over to a new antipsychotic  [] Taper to monotherapy from polypharmacy  [] Augmentation of Clozapine therapy due to treatment resistance to single therapy      Signed:  Sincere Blankenship  9/10/2018  4:00 PM    I saw and examined the patient and I agree with the above documentation.

## 2018-09-10 NOTE — PLAN OF CARE
Problem: Altered Mood, Depressive Behavior:  Goal: Able to verbalize acceptance of life and situations over which he or she has no control  Able to verbalize acceptance of life and situations over which he or she has no control   Outcome: Ongoing    Goal: Participates in care planning  Participates in care planning   Outcome: Ongoing      Comments: Patient denies SI, HI, and hallucinations. Patient appears anxious during interview. Patient was out on unit but did not socialize with peers at the beginning of the shift. Patient is currently isolative to room. Patient did not attend evening group. Patient verbally agreed to take the Lovenox shot but refused Depakene and Risperidone. When patient was asked if he would agree to take the Depakene and Risperidone, he responded \"Well, I don't know. I don't think so\". Patient was asked why he was having trouble taking the oral medications prescribed by the doctor and his response was \"I don't know\". Patient is very hopeless and it is having difficulty making basic yes/no decisions. Patient is encouraged to attend groups, take medications that are prescribed by the doctor, get a shower, get dressed and get back into an every day routine. Patient seems resistant to help. Will continue to perform 15 minute safety checks as well as observe and offer support.

## 2018-09-10 NOTE — PLAN OF CARE
Problem: Altered Mood, Depressive Behavior:  Goal: Able to verbalize and/or display a decrease in depressive symptoms  Able to verbalize and/or display a decrease in depressive symptoms   Outcome: Ongoing  Patient resting quiet to self at this time, respirations are even and unlabored, no signs or symptoms of distress or discomfort. No PRN medications given thus far this shift. Staff will continue to conduct environmental rounds to ensure the safety of everyone on the unit. Staff will provide support and interventions as requested or required.

## 2018-09-10 NOTE — PROGRESS NOTES
Physical Therapy    Facility/Department: [facility] 0 ACUTE  2  Daily Treatment Note    NAME: Edel Lara  : 1970  MRN: [id number]    Date of Service: 9/10/2018  Evaluating Therapist: Caleb Condon PT    [facility] Recommendations: front wheeled walker    Room #: 7305-A  DIAGNOSIS: Major depressive disorder  PRECAUTIONS: Fall risk, WBAT LLE with KI per outpatient note on   PMHx: 8/15/18 L quad tendon open repair    Social:  Pt lives with his brother (DD) in a 1 floor plan 2 steps and 2 rails to enter. Prior to admission pt walked with B Axillary Crutches. Initial Evaluation  Date: 18 Treatment  Date: 9/10/18 Short Term/ Long Term   Goals   Was pt agreeable to Eval/treatment? Yes Yes    Does pt have pain? Denied 5-6/10 L knee    Bed Mobility  Rolling: Independent  Supine to sit: Independent  Sit to supine: Independent  Scooting: Independent NT    Transfers Sit to stand: Supervision  Stand to sit: Supervision  Stand pivot: Supervision with [employer] Sit to stand: mod I  Stand to sit: mod I  Stand pivot: mod I with [employer] Modified Independent   Ambulation   60 feet with WW with Supervision 150 feet with [employer] with mod I >150 feet with WW with Modified Long   Stair negotiation: ascended and descended NT NT >2 steps with 2 rails with Modified Long   AM-PAC Raw Score 20/24       Comments/Treatment: Pt seated EOB upon entering room. Pt slow moving throughout tx, requiring increased time to perform functional mobility. Pt performed 10 reps of ankle pumps and seated marching to BLE while seated EOB Independently. Pt ambulated with slow steady gait with no LOB noted. Pt left seated EOB with all needs met and nursing present.        Patient education  Pt educated on performing ther-ex     Patient response to education:   Pt verbalized understanding Pt demonstrated skill Pt requires further education in this area   Yes Yes Reinforce       Time in: 0830  Time out: [address] PTA [id number]

## 2018-09-11 PROCEDURE — 6360000002 HC RX W HCPCS: Performed by: NURSE PRACTITIONER

## 2018-09-11 PROCEDURE — 99232 SBSQ HOSP IP/OBS MODERATE 35: CPT | Performed by: PSYCHIATRY & NEUROLOGY

## 2018-09-11 PROCEDURE — 1240000000 HC EMOTIONAL WELLNESS R&B

## 2018-09-11 PROCEDURE — 6360000002 HC RX W HCPCS: Performed by: PSYCHIATRY & NEUROLOGY

## 2018-09-11 PROCEDURE — 6370000000 HC RX 637 (ALT 250 FOR IP): Performed by: NURSE PRACTITIONER

## 2018-09-11 PROCEDURE — 6370000000 HC RX 637 (ALT 250 FOR IP): Performed by: PSYCHIATRY & NEUROLOGY

## 2018-09-11 RX ORDER — LORAZEPAM 2 MG/ML
1 INJECTION INTRAMUSCULAR ONCE
Status: COMPLETED | OUTPATIENT
Start: 2018-09-11 | End: 2018-09-11

## 2018-09-11 RX ADMIN — RISPERIDONE 1 MG: 0.5 TABLET, ORALLY DISINTEGRATING ORAL at 21:05

## 2018-09-11 RX ADMIN — RISPERIDONE 1 MG: 0.5 TABLET, ORALLY DISINTEGRATING ORAL at 09:14

## 2018-09-11 RX ADMIN — OLANZAPINE 10 MG: 10 TABLET, ORALLY DISINTEGRATING ORAL at 20:58

## 2018-09-11 RX ADMIN — VALPROIC ACID 250 MG: 250 SOLUTION ORAL at 06:56

## 2018-09-11 RX ADMIN — VALPROIC ACID 250 MG: 250 SOLUTION ORAL at 13:02

## 2018-09-11 RX ADMIN — LORAZEPAM 1 MG: 2 INJECTION INTRAMUSCULAR; INTRAVENOUS at 10:47

## 2018-09-11 RX ADMIN — VALPROIC ACID 250 MG: 250 SOLUTION ORAL at 20:56

## 2018-09-11 RX ADMIN — ENOXAPARIN SODIUM 30 MG: 30 INJECTION SUBCUTANEOUS at 20:56

## 2018-09-11 ASSESSMENT — PAIN SCALES - GENERAL: PAINLEVEL_OUTOF10: 0

## 2018-09-11 NOTE — PROGRESS NOTES
Group Therapy Note    Date: 9/11/2018  Start Time:  1:15  End Time:  1:50  Number of Participants: 9    Type of Group: Cognitive Skills    Wellness Binder Information  Module Name:  Coping with Anxiety  Session Number:  n/a    Patient's Goal:  Patient will identify ways to cope with anxiety in recovery. Notes:  Patient was interactive during group sharing ways to cope with anxiety. Patient participated in coloring activity. Status After Intervention:  Improved    Participation Level:  Active Listener and Interactive    Participation Quality: Appropriate, Attentive, Sharing and Supportive      Speech:  normal      Thought Process/Content: Logical      Affective Functioning: Congruent      Mood: depressed      Level of consciousness:  Alert, Oriented x4 and Attentive      Response to Learning: Able to verbalize current knowledge/experience, Able to verbalize/acknowledge new learning, Able to retain information, Capable of insight, Able to change behavior and Progressing to goal      Endings: None Reported    Modes of Intervention: Education, Support, Socialization, Exploration, Clarifying, Problem-solving and Activity      Discipline Responsible: Psychoeducational Specialist      Signature:  Navjot Peters

## 2018-09-11 NOTE — CARE COORDINATION
SPOKEN WITH MARIA C  [address].  PT HAS BEEN ACCEPTED [address] [facility] ON INSURANCE

## 2018-09-11 NOTE — PROGRESS NOTES
DATE OF SERVICE:     9/11/2018    Vladimir Holland seen today for the purpose of continuation of care. Nursing, social work reports, laboratory studies and vital signs are reviewed. Patient chief complaint today is:             [] Depression      [] Anxiety        [x] Psychosis         [] Suicidal/Homicidal                         [] Delusions           [] Aggression          Subjective: Today patient states that \"I just don't feel you should wast the medications on me. I don't need to be here, there are people who really need help and I am not one of those people. \"  Patient is isolative and bizarre, constricted, and flat. Patient is not taking medications as ordered. Sleep:  [] Good [] Fair  [x] Poor  Appetite:  [] Good [x] Fair  [] Poor    Depression:  [] Mild [] Moderate [x] Severe                [x] Constant [] Sporadic     Anxiety: [] Mild [] Moderate [x] Severe    [x] Constant [] Sporadic     Delusions: [] Mild [] Moderate [x] Severe     [x] Constant [] Sporadic     [x] Paranoid [] Somatic [] Grandiose     Hallucinations: [] Mild [] Moderate [] Severe     [] Constant [] Sporadic    [] Auditory  [] Visual [] Tactile       Suicidal: [] Constant [] Sporadic  Homicidal: [] Constant [] Sporadic    Unscheduled Medications     [] Patient Receiving Emergency Medications \" Chemical Restraint\"   [] Requesting PRN medications for anxiety    Medical Review of Systems:     All other than marked systmes have been reviewed and are all negative.     Constitutional Symptoms: []  fever []  Chills  Skin Symptoms: [] rash []  Pruritus   Eye Symptoms: [] Vision unchanged []  recent vision problems[] blurred vision   Respiratory Symptoms:[] shortness of breath [] cough  Cardiovascular Symptoms:  [] chest pain   [] palpitations   Gastrointestinal Symptoms: []  abdominal pain []  nausea []  vomiting []  diarrhea  Genitourinary Symptoms: []  dysuria  []  hematuria   Musculoskeletal Symptoms: []  back pain []  muscle pain []  joint pain  Neurologic Symptoms: []  headache []  dizziness  Hematolymphoid Symptoms: [] Adenopathy [] Bruises   [] Schimosis       Psychiatric Review of systems  Delusions:  [] Denies [] Endorses   Withdrawals:  [] Denies [] Endorses    Hallucinations: [] Denies [] Endorses    Extra Pyramidal Symptoms: [] Denies [] Endorses      BP (!) 149/93   Pulse 104   Temp 97.1 °F (36.2 °C) (Temporal)   Resp 18   Ht 5' 9\" (1.753 m)   Wt 220 lb (99.8 kg)   SpO2 99%   BMI 32.49 kg/m²     Mental Status Examination:    Cognition:      [x] Alert  [x] Awake  [x] Oriented  [x] Person  [x] Place [x] Time      [] drowsy  [] tired  [] lethargic  [] distractable  [] Other     Attention/Concentration:   [x] Attentive  [] Distracted        Memory Recent and Remote: [x] Intact   [] Impaired [] Partially Impaired     Language: [] Able to recognize and name objects          [] Unable to recognize and name Objects    Fund of Knowledge:  [] Poor []  Fair  [] Good    Speech: [x] Normal  [] Soft  [] Slow  [] Fast [] Pressured            [] Loud [] Dysarthria  [] Incoherent    Appearance: [] Well Groomed  [x] Casual Dressed  [] Unkept  [] Disheveled          [] Normal weight[] Thin  [] Overweight  [] Obese           Attitude: [] Positive  [] Hostile  [] Demanding  [] Guarded  [] Defensive         [x] Cooperative  []  Uncooperative      Behavior:  [x] Normal Gait  [] Walks with Assistance  [] Angela Chair    [] Walks with [facility]  [] In Hospital Bed  [] Sitting in Chair    Muscle-Skeletal:  [x] Normal Muscle Tone [] Muscle Atrophy       [] Abnormal Muscle Movement     Eye Contact:  [] Good eye contact  [x] Intermittent Eye Contact  [] Poor Eye Contact     Mood: [x] Depressed  [x] Anxious  [] Irritated  [] Euthymic   [] Angry [] Restless    Affect:  [] Congruent  [] Incongruent  [] Labile  [x] Constricted  [x] Flat  [x] Bizarre     Thought Process and Association:  [] Logical [] Illogical       [] Linear and Goal Directed  [] Tangential

## 2018-09-11 NOTE — PROGRESS NOTES
Patient administered 1 mg Ativan IM per Dr. Jenn Nj order. Patient educated  and agreed to take medication willingly. Witnessed by Ally Lehman RN.

## 2018-09-11 NOTE — PLAN OF CARE
Problem: Altered Mood, Depressive Behavior:  Goal: Able to verbalize and/or display a decrease in depressive symptoms  Able to verbalize and/or display a decrease in depressive symptoms   Outcome: Not Met This Shift  Patient resting quiet to self at this time, respirations are even and unlabored, no signs or symptoms of distress or discomfort. No PRN medications given thus far this shift. Staff will continue to conduct environmental rounds to ensure the safety of everyone on the unit. Staff will provide support and interventions as requested or required.

## 2018-09-12 PROCEDURE — 99232 SBSQ HOSP IP/OBS MODERATE 35: CPT | Performed by: PSYCHIATRY & NEUROLOGY

## 2018-09-12 PROCEDURE — 6370000000 HC RX 637 (ALT 250 FOR IP): Performed by: PSYCHIATRY & NEUROLOGY

## 2018-09-12 PROCEDURE — 97535 SELF CARE MNGMENT TRAINING: CPT

## 2018-09-12 PROCEDURE — 6360000002 HC RX W HCPCS: Performed by: NURSE PRACTITIONER

## 2018-09-12 PROCEDURE — 1240000000 HC EMOTIONAL WELLNESS R&B

## 2018-09-12 PROCEDURE — 97530 THERAPEUTIC ACTIVITIES: CPT

## 2018-09-12 PROCEDURE — 6370000000 HC RX 637 (ALT 250 FOR IP): Performed by: NURSE PRACTITIONER

## 2018-09-12 RX ORDER — OLANZAPINE 10 MG/1
20 TABLET, ORALLY DISINTEGRATING ORAL NIGHTLY
Status: DISCONTINUED | OUTPATIENT
Start: 2018-09-12 | End: 2018-09-14

## 2018-09-12 RX ADMIN — RISPERIDONE 1 MG: 0.5 TABLET, ORALLY DISINTEGRATING ORAL at 08:55

## 2018-09-12 RX ADMIN — ENOXAPARIN SODIUM 30 MG: 30 INJECTION SUBCUTANEOUS at 22:49

## 2018-09-12 RX ADMIN — VALPROIC ACID 250 MG: 250 SOLUTION ORAL at 13:06

## 2018-09-12 RX ADMIN — ATORVASTATIN CALCIUM 20 MG: 20 TABLET, FILM COATED ORAL at 08:55

## 2018-09-12 RX ADMIN — VALPROIC ACID 250 MG: 250 SOLUTION ORAL at 06:09

## 2018-09-12 RX ADMIN — ENOXAPARIN SODIUM 30 MG: 30 INJECTION SUBCUTANEOUS at 08:54

## 2018-09-12 RX ADMIN — RISPERIDONE 1 MG: 0.5 TABLET, ORALLY DISINTEGRATING ORAL at 21:29

## 2018-09-12 RX ADMIN — VALPROIC ACID 250 MG: 250 SOLUTION ORAL at 21:28

## 2018-09-12 RX ADMIN — LEVOTHYROXINE SODIUM 25 MCG: 25 TABLET ORAL at 08:55

## 2018-09-12 RX ADMIN — Medication 2000 UNITS: at 08:55

## 2018-09-12 ASSESSMENT — PAIN SCALES - GENERAL: PAINLEVEL_OUTOF10: 0

## 2018-09-12 NOTE — PROGRESS NOTES
Physical Therapy    Facility/Department: [address]  Daily Treatment Note    NAME: Yrn Mota  : 1970  MRN: [id number]    Date of Service: 2018  Evaluating Therapist: KOKI Downs Recommendations: front wheeled walker    Room #: 7305-A  DIAGNOSIS: Major depressive disorder  PRECAUTIONS: Fall risk, WBAT LLE with KI per outpatient note on   PMHx: 8/15/18 L quad tendon open repair    Social:  Pt lives with his brother (DD) in a 1 floor plan 2 steps and 2 rails to enter. Prior to admission pt walked with B Axillary Crutches. Initial Evaluation  Date: 18 Treatment  Date: 18 Short Term/ Long Term   Goals   Was pt agreeable to Eval/treatment? Yes Yes    Does pt have pain? Denied pain L knee no rating    Bed Mobility  Rolling: Independent  Supine to sit: Independent  Sit to supine: Independent  Scooting: Independent NT    Transfers Sit to stand: Supervision  Stand to sit: Supervision  Stand pivot: Supervision with [employer] Sit to stand: min A  Stand to sit: min A  Stand pivot: min A with [employer] Modified Independent   Ambulation   60 feet with WW with Supervision 5 feet with ww Min A see comments. >150 feet with [employer] with Modified Mason   Stair negotiation: ascended and descended NT NT >2 steps with 2 rails with Modified Mason   AM-PAC Raw Score     Therex: B LE Ap's, LAQ and marches until fatigued. Comments/Treatment: Pt seated in common area on arrival. Pt did not have brace donned on arrival and states it is ill fitting. Educated pt on importance of brace. Pt minimally acknowledged. Pt reporting feeling highly depressive today. Also reported he feels as though his functional mobility has significantly decreased from previous session. Pt stated \" I feel like my leg is going to explode if I walk\" Pt began ambulating and appeared very unsteady, therefore returned to seated position.      Patient education  Pt educated on performing ther-ex

## 2018-09-12 NOTE — PROGRESS NOTES
Pt did attend and did  participate in  groups. worried mood, flat affect. Poor insight and questionable judgement. Pt states he will  talk with staff if they experience feelings of SI or HI. No c/o or aggressive behavior. did have interactions with staff and peers. Monitored for safety, encouraged to express feelings, and to attend groups. Pt was  cooperative and showed  response to encouragement. Pt was  med compliant. .  Will continue to monitor.

## 2018-09-12 NOTE — PROGRESS NOTES
DATE OF SERVICE:     9/12/2018    Ryan Garcia seen today for the purpose of continuation of care. Nursing, social work reports, laboratory studies and vital signs are reviewed. Patient chief complaint today is:             [] Depression      [] Anxiety        [] Psychosis         [] Suicidal/Homicidal                         [] Delusions           [] Aggression          Subjective: Today patient states that \"I feel like I am not doing very well. I just don't know. \"  Patient is paranoid and feels as though he is unable to walk today. Patient was seen earlier ambulating in room without walker, PT/OT saw patient. Denies SI, HI, or AVH currently, however stated to nursing that he sometimes sees people. Sleep:  [] Good [x] Fair  [] Poor  Appetite:  [] Good [x] Fair  [] Poor    Depression:  [] Mild [] Moderate [x] Severe                [x] Constant [] Sporadic     Anxiety: [] Mild [] Moderate [x] Severe    [x] Constant [] Sporadic     Delusions: [] Mild [] Moderate [x] Severe     [x] Constant [] Sporadic     [x] Paranoid [x] Somatic [] Grandiose     Hallucinations: [] Mild [] Moderate [] Severe     [] Constant [] Sporadic    [] Auditory  [] Visual [] Tactile       Suicidal: [] Constant [] Sporadic  Homicidal: [] Constant [] Sporadic    Unscheduled Medications     [] Patient Receiving Emergency Medications \" Chemical Restraint\"   [] Requesting PRN medications for anxiety    Medical Review of Systems:     All other than marked systmes have been reviewed and are all negative.     Constitutional Symptoms: []  fever []  Chills  Skin Symptoms: [] rash []  Pruritus   Eye Symptoms: [] Vision unchanged []  recent vision problems[] blurred vision   Respiratory Symptoms:[] shortness of breath [] cough  Cardiovascular Symptoms:  [] chest pain   [] palpitations   Gastrointestinal Symptoms: []  abdominal pain []  nausea []  vomiting []  diarrhea  Genitourinary Symptoms: []  dysuria  []  hematuria   Musculoskeletal Symptoms: []  back pain []  muscle pain []  joint pain  Neurologic Symptoms: []  headache []  dizziness  Hematolymphoid Symptoms: [] Adenopathy [] Bruises   [] Schimosis       Psychiatric Review of systems  Delusions:  [] Denies [] Endorses   Withdrawals:  [] Denies [] Endorses    Hallucinations: [] Denies [] Endorses    Extra Pyramidal Symptoms: [] Denies [] Endorses      /77   Pulse 79   Temp 98.1 °F (36.7 °C) (Temporal)   Resp 16   Ht 5' 9\" (1.753 m)   Wt 220 lb (99.8 kg)   SpO2 98%   BMI 32.49 kg/m²     Mental Status Examination:    Cognition:      [x] Alert  [x] Awake  [x] Oriented  [x] Person  [x] Place [x] Time      [] drowsy  [] tired  [] lethargic  [] distractable  [] Other     Attention/Concentration:   [x] Attentive  [] Distracted        Memory Recent and Remote: [x] Intact   [] Impaired [] Partially Impaired     Language: [] Able to recognize and name objects          [] Unable to recognize and name Objects    Fund of Knowledge:  [] Poor []  Fair  [] Good    Speech: [] Normal  [x] Soft  [] Slow  [] Fast [] Pressured            [] Loud [] Dysarthria  [] Incoherent    Appearance: [] Well Groomed  [] Casual Dressed  [x] Unkept  [] Disheveled          [] Normal weight[] Thin  [] Overweight  [] Obese           Attitude: [] Positive  [] Hostile  [] Demanding  [] Guarded  [] Defensive         [x] Cooperative  []  Uncooperative      Behavior:  [x] Normal Gait  [] Walks with Assistance  [] Angela Chair    [] Walks with Eliot Nas  [] In Hospital Bed  [] Sitting in Chair    Muscle-Skeletal:  [x] Normal Muscle Tone [] Muscle Atrophy       [] Abnormal Muscle Movement     Eye Contact:  [] Good eye contact  [x] Intermittent Eye Contact  [] Poor Eye Contact     Mood: [x] Depressed  [x] Anxious  [] Irritated  [] Euthymic   [] Angry [] Restless    Affect:  [] Congruent  [] Incongruent  [] Labile  [x] Constricted  [x] Flat  [] Bizarre     Thought Process and Association:  [] Logical [] Illogical       [] Linear and
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
## 2018-09-12 NOTE — PROGRESS NOTES
Group Therapy Note    Date: 9/12/2018  Start Time: 1:15  End Time:   2:00  Number of Participants: 7    Type of Group: Recovery    Wellness Binder Information  Module Name:  Anger Mangement  Session Number: Pt will be able id myths underlying assumptions and,ways to calm down and    Patient's Goal: Pt will be able to id myths, underlying assumptions and ways to calm      Notes:  Pt was able to verbalize  And acknowledge new learningnew learning    Status After Intervention:  Improved    Participation Level: Interactive    Participation Quality: Attentive and Sharing      Speech: Normal      Thought Process/Content: Logical      Affective Functioning: Congruent      Mood: anxious and depressed      Level of consciousness:  Attentive      Response to Learning: Able to verbalize/acknowledge new learning      Endings: None Reported    Modes of Intervention: Support      Discipline Responsible: /Counselor      Signature:  NEWTON Crook

## 2018-09-12 NOTE — PROGRESS NOTES
Patient declined to attend community meeting. Patient identified goal for the day as:  \"Keep myself going\"

## 2018-09-13 LAB
CREAT SERPL-MCNC: 0.8 MG/DL (ref 0.7–1.2)
GFR AFRICAN AMERICAN: >60
GFR NON-AFRICAN AMERICAN: >60 ML/MIN/1.73

## 2018-09-13 PROCEDURE — 97535 SELF CARE MNGMENT TRAINING: CPT

## 2018-09-13 PROCEDURE — 82565 ASSAY OF CREATININE: CPT

## 2018-09-13 PROCEDURE — 6370000000 HC RX 637 (ALT 250 FOR IP): Performed by: NURSE PRACTITIONER

## 2018-09-13 PROCEDURE — 97530 THERAPEUTIC ACTIVITIES: CPT

## 2018-09-13 PROCEDURE — 99231 SBSQ HOSP IP/OBS SF/LOW 25: CPT | Performed by: PSYCHIATRY & NEUROLOGY

## 2018-09-13 PROCEDURE — 6370000000 HC RX 637 (ALT 250 FOR IP): Performed by: PSYCHIATRY & NEUROLOGY

## 2018-09-13 PROCEDURE — 6360000002 HC RX W HCPCS: Performed by: NURSE PRACTITIONER

## 2018-09-13 PROCEDURE — 36415 COLL VENOUS BLD VENIPUNCTURE: CPT

## 2018-09-13 PROCEDURE — 1240000000 HC EMOTIONAL WELLNESS R&B

## 2018-09-13 RX ADMIN — HYDROXYZINE PAMOATE 50 MG: 50 CAPSULE ORAL at 00:03

## 2018-09-13 RX ADMIN — VALPROIC ACID 250 MG: 250 SOLUTION ORAL at 14:43

## 2018-09-13 RX ADMIN — RISPERIDONE 1 MG: 0.5 TABLET, ORALLY DISINTEGRATING ORAL at 08:51

## 2018-09-13 RX ADMIN — ENOXAPARIN SODIUM 30 MG: 30 INJECTION SUBCUTANEOUS at 08:50

## 2018-09-13 RX ADMIN — Medication 2000 UNITS: at 08:52

## 2018-09-13 RX ADMIN — VALPROIC ACID 250 MG: 250 SOLUTION ORAL at 06:34

## 2018-09-13 RX ADMIN — TRAZODONE HYDROCHLORIDE 50 MG: 50 TABLET ORAL at 00:03

## 2018-09-13 RX ADMIN — VALPROIC ACID 250 MG: 250 SOLUTION ORAL at 21:29

## 2018-09-13 RX ADMIN — OLANZAPINE 20 MG: 10 TABLET, ORALLY DISINTEGRATING ORAL at 21:29

## 2018-09-13 RX ADMIN — LEVOTHYROXINE SODIUM 25 MCG: 25 TABLET ORAL at 08:52

## 2018-09-13 RX ADMIN — ATORVASTATIN CALCIUM 20 MG: 20 TABLET, FILM COATED ORAL at 08:52

## 2018-09-13 RX ADMIN — RISPERIDONE 1 MG: 0.5 TABLET, ORALLY DISINTEGRATING ORAL at 21:29

## 2018-09-13 ASSESSMENT — PAIN DESCRIPTION - LOCATION: LOCATION: HEAD

## 2018-09-13 ASSESSMENT — PAIN SCALES - GENERAL
PAINLEVEL_OUTOF10: 0
PAINLEVEL_OUTOF10: 0

## 2018-09-13 ASSESSMENT — PAIN DESCRIPTION - DESCRIPTORS: DESCRIPTORS: HEADACHE;PATIENT UNABLE TO DESCRIBE

## 2018-09-13 ASSESSMENT — PAIN DESCRIPTION - PAIN TYPE: TYPE: ACUTE PAIN

## 2018-09-13 NOTE — PROGRESS NOTES
Pt resting. Pt slept most of the night. Pt had his bed turned and was given a short cord call light d/t another pt entering room all of the time and he was uncomfortable with that and therefore requested the changes. There have been on incidences since then.   Electronically signed by Aretta Siemens, RN on 9/13/2018 at 6:57 AM

## 2018-09-13 NOTE — PROGRESS NOTES
Sent a message via [employer] to Dr. Vik Torre re: \"Pt is on psych, has not been seen by IM since 9/3. But he is an anticipated discharge to [facility] tomorrow and I wanted to check and see if you needed to add anything to his discharge instructions and if you wanted to sign off on him. \"    Spoke with Dr. Vik Torre, he states that Internal Med is now signed off and no additional instructions for discharge instructions.     Electronically signed by Priya Donis RN on 9/13/2018 at 3:59 PM

## 2018-09-13 NOTE — PROGRESS NOTES
Declined to attend morning group. Slow and having difficulty expressing self this morning (per patient). Apologetic and accepting of support to take time and try next group, as improves. Observed to be laying in bed, with minimal peer interaction. Identified goal as \"get more comfortable expressing myself\" with much assistance.

## 2018-09-13 NOTE — PROGRESS NOTES
mobility/activity    Pt was left sitting in chair  with call light left by patient. Time in: 1400  Time out: 1425    Pt is making  progress toward established Physical Therapy goals. Continue with physical therapy current plan of care.     Solo Post Number: [id number]

## 2018-09-13 NOTE — PROGRESS NOTES
#1:  Demo good follow through of walker safety precautions in ADL's and transfers      STG #2:  Increase grooming to mod I in standing/ sitting with ww  STGl #3   Increase UE dressing and bathing to mod I            STG #4   Increase LE dressing and bathing to mod I with AE prn      STG #5   Increase functional commode transfer to mod I with ww  STG #6   Increase safety and problem solving to good- during ADL's  STG #7   Increase functional activity tolerance to good in ADL tasks    Functional Assessment:      Initial Eval Status 9/4/18 Current status: 9/13/2018   Feeding  Supervision with poor initiation and sequencing  Per eval.  Will continue to assess. Grooming  Supervision with increased time and cues to complete hand washing Supervision  Seated    Upper Body Dressing Setup    Per eval.  Will continue to assess. Lower Body Dressing Mod A  Min A  Educated on LB AE. Donned/doffed socks seated EOB using reacher and sock aid. Pt educated on proper technique to don knee brace requiring Max A. Bathing Mod A Per eval.  Pt stated having difficulty while showering d/t pain. Pt educated to use shower seat for safety during stay and upon d/c. Pt verbalized understanding. Will continue to assess. Issued LB AE   Toileting  SBA with increased time SBA- toilet transfer, educated on hand placement and body mechanics. Bed Mobility  Supine to Sit: n/t  Sit to Supine:n/t SBA- supine to sit, educated on technique to increase independence. Increased time required.      Functional Transfers Sit to stand Sit<>Stand- SBA  Cuing for hand placement and body mechanics. Functional Mobility SBA with ww household distance  SBA  Home distances using w/w, cuing for w/w safety.      Sit balance: Good  Stand balance: SBA with ww for balance and safety  Endurance/Activity tolerance: fair-. Pt limited due to decreased endurance and increased feeling of hopelessness and fear of returning home.  Required encouragement to complete all

## 2018-09-13 NOTE — PLAN OF CARE
Problem: Altered Mood, Depressive Behavior:  Goal: Ability to disclose and discuss suicidal ideas will improve  Ability to disclose and discuss suicidal ideas will improve   Outcome: Ongoing    Goal: Able to verbalize support systems  Able to verbalize support systems   Outcome: Ongoing    Goal: Absence of self-harm  Absence of self-harm   Outcome: Ongoing    Goal: Patient specific goal  Patient specific goal   Outcome: Ongoing    Goal: Participates in care planning  Participates in care planning   Outcome: Ongoing      Comments: Pt encouraged to attend group therapy to learn how to manage his depression. Pt was also encouraged to come out of his room and socialize more often. We will continue to provide support and comfort.  Electronically signed by Valeria Cordon RN on 9/13/2018 at 2:36 AM

## 2018-09-13 NOTE — PROGRESS NOTES
DATE OF SERVICE:     9/13/2018    Juana Holland seen today for the purpose of continuation of care. Nursing, social work reports, laboratory studies and vital signs are reviewed. Patient chief complaint today is:             [x] Depression      [x] Anxiety        [] Psychosis         [] Suicidal/Homicidal                         [x] Delusions           [] Aggression          Subjective: Today patient states that \"I have depression and anxiety, the zyprexa helps. I don't want to die. \"  Patient is disorganized with his thoughts, sad and anxious. Patient is up walking with walker in room. Denies SI, HI, or AVH. Sleep:  [] Good [x] Fair  [] Poor  Appetite:  [] Good [x] Fair  [] Poor    Depression:  [] Mild [] Moderate [x] Severe                [x] Constant [] Sporadic     Anxiety: [] Mild [] Moderate [x] Severe    [x] Constant [] Sporadic     Delusions: [] Mild [] Moderate [x] Severe     [] Constant [x] Sporadic     [] Paranoid [x] Somatic [] Grandiose     Hallucinations: [] Mild [] Moderate [] Severe     [] Constant [] Sporadic    [] Auditory  [] Visual [] Tactile       Suicidal: [] Constant [] Sporadic  Homicidal: [] Constant [] Sporadic    Unscheduled Medications     [] Patient Receiving Emergency Medications \" Chemical Restraint\"   [] Requesting PRN medications for anxiety    Medical Review of Systems:     All other than marked systmes have been reviewed and are all negative.     Constitutional Symptoms: []  fever []  Chills  Skin Symptoms: [] rash []  Pruritus   Eye Symptoms: [] Vision unchanged []  recent vision problems[] blurred vision   Respiratory Symptoms:[] shortness of breath [] cough  Cardiovascular Symptoms:  [] chest pain   [] palpitations   Gastrointestinal Symptoms: []  abdominal pain []  nausea []  vomiting []  diarrhea  Genitourinary Symptoms: []  dysuria  []  hematuria   Musculoskeletal Symptoms: []  back pain []  muscle pain []  joint pain  Neurologic Symptoms: []  headache [] dizziness  Hematolymphoid Symptoms: [] Adenopathy [] Bruises   [] Schimosis       Psychiatric Review of systems  Delusions:  [] Denies [] Endorses   Withdrawals:  [] Denies [] Endorses    Hallucinations: [] Denies [] Endorses    Extra Pyramidal Symptoms: [] Denies [] Endorses      /65   Pulse 115   Temp 98.1 °F (36.7 °C) (Temporal)   Resp 18   Ht 5' 9\" (1.753 m)   Wt 220 lb (99.8 kg)   SpO2 97%   BMI 32.49 kg/m²     Mental Status Examination:    Cognition:      [x] Alert  [x] Awake  [x] Oriented  [x] Person  [x] Place [x] Time      [] drowsy  [] tired  [] lethargic  [] distractable  [] Other     Attention/Concentration:   [x] Attentive  [] Distracted        Memory Recent and Remote: [x] Intact   [] Impaired [] Partially Impaired     Language: [] Able to recognize and name objects          [] Unable to recognize and name Objects    Fund of Knowledge:  [] Poor []  Fair  [] Good    Speech: [] Normal  [x] Soft  [] Slow  [] Fast [] Pressured            [] Loud [] Dysarthria  [] Incoherent    Appearance: [] Well Groomed  [x] Casual Dressed  [] Unkept  [] Disheveled          [] Normal weight[] Thin  [] Overweight  [] Obese           Attitude: [] Positive  [] Hostile  [] Demanding  [] Guarded  [] Defensive         [x] Cooperative  []  Uncooperative      Behavior:  [] Normal Gait  [] Walks with Assistance  [] Angela Chair    [x] Walks with Katie Dunn  [] In Hospital Bed  [] Sitting in Chair    Muscle-Skeletal:  [x] Normal Muscle Tone [] Muscle Atrophy       [] Abnormal Muscle Movement     Eye Contact:  [] Good eye contact  [x] Intermittent Eye Contact  [] Poor Eye Contact     Mood: [x] Depressed  [x] Anxious  [] Irritated  [] Euthymic   [] Angry [] Restless    Affect:  [x] Congruent  [] Incongruent  [] Labile  [] Constricted  [] Flat  [] Bizarre     Thought Process and Association:  [] Logical [] Illogical       [] Linear and Goal Directed  [x] Tangential  [x] Circumstantial     Thought Content:  [] Denies [] Endorses [] Suicidal [] Homicidal  [x] Delusional      [] Paranoid  [x] Somatic  [] Grandiose    Perception: [x]  None  [] Auditory   [] Visual  [] tactile   [] olfactory  [] Illusions         Insight: [] Intact  [] Fair  [x] Limited    Judgement:  [] Intact  [] Fair  [x] Limited      Assessment/Plan:        Patient Active Problem List   Diagnosis Code    Lymphedema of leg I89.0    Acquired hypothyroidism E03.9    Acquired hypothyroidism E03.9    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    Abnormal prostate exam R39.89    Disorder of bone M89.9    Fatigue R53.83    Hyperlipidemia E78.5    Hypothyroid E03.9    Psoriasis L40.9    Reflux esophagitis K21.0    History of surgery Z98.890    MDD (major depressive disorder), single episode F32.9    Major depressive disorder, severe ([facility] [address] F32.2         Plan:    []  Patient is refusing medications  [x] Improving as expected Will continue current treatment and possibly discharge to [facility] tomorrow   [] Not improving as expected   [] Worsening    []  At Baseline       Reason for more than one antipsychotic:  [x] N/A  [] 3 failed monotherapy(drugs tried):  [] Cross over to a new antipsychotic  [] Taper to monotherapy from polypharmacy  [] Augmentation of Clozapine therapy due to treatment resistance to single therapy      Signed:  Skyler Cain  9/13/2018  4:03 PM    I saw and examined the patient and I agree with the above documentation.

## 2018-09-14 VITALS
TEMPERATURE: 98.2 F | WEIGHT: 220 LBS | RESPIRATION RATE: 18 BRPM | HEART RATE: 90 BPM | SYSTOLIC BLOOD PRESSURE: 126 MMHG | DIASTOLIC BLOOD PRESSURE: 84 MMHG | BODY MASS INDEX: 32.58 KG/M2 | OXYGEN SATURATION: 100 % | HEIGHT: 69 IN

## 2018-09-14 PROCEDURE — 99238 HOSP IP/OBS DSCHRG MGMT 30/<: CPT | Performed by: PSYCHIATRY & NEUROLOGY

## 2018-09-14 PROCEDURE — 6370000000 HC RX 637 (ALT 250 FOR IP): Performed by: PSYCHIATRY & NEUROLOGY

## 2018-09-14 PROCEDURE — 6370000000 HC RX 637 (ALT 250 FOR IP): Performed by: NURSE PRACTITIONER

## 2018-09-14 RX ORDER — OLANZAPINE 10 MG/1
20 TABLET, ORALLY DISINTEGRATING ORAL NIGHTLY
Status: DISCONTINUED | OUTPATIENT
Start: 2018-09-14 | End: 2018-09-14 | Stop reason: HOSPADM

## 2018-09-14 RX ORDER — OLANZAPINE 20 MG/1
20 TABLET, ORALLY DISINTEGRATING ORAL NIGHTLY
Qty: 30 TABLET | Refills: 0 | Status: ON HOLD | OUTPATIENT
Start: 2018-09-14 | End: 2018-11-26 | Stop reason: HOSPADM

## 2018-09-14 RX ADMIN — LEVOTHYROXINE SODIUM 25 MCG: 25 TABLET ORAL at 08:45

## 2018-09-14 RX ADMIN — VALPROIC ACID 250 MG: 250 SOLUTION ORAL at 13:25

## 2018-09-14 RX ADMIN — VALPROIC ACID 250 MG: 250 SOLUTION ORAL at 06:55

## 2018-09-14 RX ADMIN — RISPERIDONE 1 MG: 0.5 TABLET, ORALLY DISINTEGRATING ORAL at 08:45

## 2018-09-14 RX ADMIN — Medication 2000 UNITS: at 08:45

## 2018-09-14 RX ADMIN — ATORVASTATIN CALCIUM 20 MG: 20 TABLET, FILM COATED ORAL at 08:44

## 2018-09-14 ASSESSMENT — PAIN SCALES - GENERAL
PAINLEVEL_OUTOF10: 0
PAINLEVEL_OUTOF10: 0

## 2018-09-14 NOTE — CARE COORDINATION
PT ACCEPTED AT [address] [place] [address]., PT WILL BE TRANSPORTED BY [facility] AT 2PM    NURSE WAS INFORMED TO CALL NURSE TO NURSE  TO    [phone]

## 2018-09-14 NOTE — PLAN OF CARE
Problem: Altered Mood, Deterioration in Function:  Goal: Ability to perform activities of daily living will improve  Ability to perform activities of daily living will improve   Outcome: Ongoing  Does not initiate ADLS needs encouraged .

## 2018-09-14 NOTE — PLAN OF CARE
Problem: Altered Mood, Depressive Behavior:  Goal: Ability to disclose and discuss suicidal ideas will improve  Ability to disclose and discuss suicidal ideas will improve   Outcome: Ongoing    Goal: Able to verbalize support systems  Able to verbalize support systems   Outcome: Ongoing    Goal: Absence of self-harm  Absence of self-harm   Outcome: Ongoing    Goal: Patient specific goal  Patient specific goal   Outcome: Ongoing    Goal: Participates in care planning  Participates in care planning   Outcome: Ongoing

## 2018-09-14 NOTE — DISCHARGE SUMMARY
Other         Discharge disposition:  [] Home [] Step Down unit  [] Group Home [x]  [facility]                                                    [] [facility]    [] AMA  [] Other           Prescriptions: Continue same medications, review with patient.        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

## 2018-09-18 ENCOUNTER — TELEPHONE (OUTPATIENT)
Dept: ORTHOPEDIC SURGERY | Age: 48
End: 2018-09-18

## 2018-09-18 NOTE — TELEPHONE ENCOUNTER
Received message from dajuan at [facility] re pt f/u and treatment for tendon repair from 8/15--attempted to ret pts call and left message with call back number for her to ret call

## 2018-09-20 NOTE — TELEPHONE ENCOUNTER
Called [facility] and spoke with bryan who is nurse on unit today--she stated dajuan is not there today and she does not know the reason why she called about pt--told her to have her call me back on her next day to work

## 2018-10-01 ENCOUNTER — OFFICE VISIT (OUTPATIENT)
Dept: ORTHOPEDIC SURGERY | Age: 48
End: 2018-10-01
Payer: MEDICARE

## 2018-10-01 VITALS
DIASTOLIC BLOOD PRESSURE: 72 MMHG | SYSTOLIC BLOOD PRESSURE: 102 MMHG | BODY MASS INDEX: 32.58 KG/M2 | HEART RATE: 96 BPM | HEIGHT: 68 IN | WEIGHT: 215 LBS

## 2018-10-01 DIAGNOSIS — S76.112D RUPTURE OF LEFT QUADRICEPS TENDON, SUBSEQUENT ENCOUNTER: Primary | ICD-10-CM

## 2018-10-01 PROCEDURE — 99024 POSTOP FOLLOW-UP VISIT: CPT | Performed by: PHYSICIAN ASSISTANT

## 2018-10-01 PROCEDURE — 99212 OFFICE O/P EST SF 10 MIN: CPT

## 2018-10-18 ENCOUNTER — HOSPITAL ENCOUNTER (OUTPATIENT)
Age: 48
Discharge: HOME | End: 2018-10-20
Payer: MEDICARE

## 2018-10-18 LAB
ALBUMIN SERPL-MCNC: 4.1 G/DL (ref 3.5–5.2)
ALP BLD-CCNC: 59 U/L (ref 40–129)
ALT SERPL-CCNC: 20 U/L (ref 0–40)
ANION GAP SERPL CALCULATED.3IONS-SCNC: 12 MMOL/L (ref 7–16)
AST SERPL-CCNC: 17 U/L (ref 0–39)
BACTERIA: NORMAL /HPF
BILIRUB SERPL-MCNC: 0.7 MG/DL (ref 0–1.2)
BILIRUBIN URINE: NEGATIVE
BLOOD, URINE: ABNORMAL
BUN BLDV-MCNC: 10 MG/DL (ref 6–20)
CALCIUM SERPL-MCNC: 9.7 MG/DL (ref 8.6–10.2)
CHLORIDE BLD-SCNC: 98 MMOL/L (ref 98–107)
CHOLESTEROL, TOTAL: 180 MG/DL (ref 0–199)
CLARITY: CLEAR
CO2: 26 MMOL/L (ref 22–29)
COLOR: YELLOW
CREAT SERPL-MCNC: 0.8 MG/DL (ref 0.7–1.2)
GFR AFRICAN AMERICAN: >60
GFR NON-AFRICAN AMERICAN: >60 ML/MIN/1.73
GLUCOSE BLD-MCNC: 88 MG/DL (ref 74–109)
GLUCOSE URINE: NEGATIVE MG/DL
HBA1C MFR BLD: 5.8 % (ref 4–5.6)
HDLC SERPL-MCNC: 42 MG/DL
KETONES, URINE: NEGATIVE MG/DL
LDL CHOLESTEROL CALCULATED: 110 MG/DL (ref 0–99)
LEUKOCYTE ESTERASE, URINE: NEGATIVE
NITRITE, URINE: NEGATIVE
PH UA: 7 (ref 5–9)
POTASSIUM SERPL-SCNC: 4 MMOL/L (ref 3.5–5)
PROSTATE SPECIFIC ANTIGEN: 0.78 NG/ML (ref 0–4)
PROTEIN UA: NEGATIVE MG/DL
RBC UA: NORMAL /HPF (ref 0–2)
SODIUM BLD-SCNC: 136 MMOL/L (ref 132–146)
SPECIFIC GRAVITY UA: 1.01 (ref 1–1.03)
TOTAL PROTEIN: 7.5 G/DL (ref 6.4–8.3)
TRIGL SERPL-MCNC: 138 MG/DL (ref 0–149)
TSH SERPL DL<=0.05 MIU/L-ACNC: 2.28 UIU/ML (ref 0.27–4.2)
UROBILINOGEN, URINE: 0.2 E.U./DL
VLDLC SERPL CALC-MCNC: 28 MG/DL
WBC UA: NORMAL /HPF (ref 0–5)

## 2018-10-18 PROCEDURE — 80061 LIPID PANEL: CPT

## 2018-10-18 PROCEDURE — 83036 HEMOGLOBIN GLYCOSYLATED A1C: CPT

## 2018-10-18 PROCEDURE — 84443 ASSAY THYROID STIM HORMONE: CPT

## 2018-10-18 PROCEDURE — 81001 URINALYSIS AUTO W/SCOPE: CPT

## 2018-10-18 PROCEDURE — 84153 ASSAY OF PSA TOTAL: CPT

## 2018-10-18 PROCEDURE — 80053 COMPREHEN METABOLIC PANEL: CPT

## 2018-10-25 ENCOUNTER — TELEPHONE (OUTPATIENT)
Dept: PRIMARY CARE CLINIC | Age: 48
End: 2018-10-25

## 2018-10-30 ENCOUNTER — OFFICE VISIT (OUTPATIENT)
Dept: PRIMARY CARE CLINIC | Age: 48
End: 2018-10-30
Payer: MEDICARE

## 2018-10-30 ENCOUNTER — TELEPHONE (OUTPATIENT)
Dept: PRIMARY CARE CLINIC | Age: 48
End: 2018-10-30

## 2018-10-30 VITALS
RESPIRATION RATE: 18 BRPM | TEMPERATURE: 97.9 F | SYSTOLIC BLOOD PRESSURE: 92 MMHG | BODY MASS INDEX: 29.86 KG/M2 | HEIGHT: 68 IN | HEART RATE: 77 BPM | WEIGHT: 197 LBS | DIASTOLIC BLOOD PRESSURE: 62 MMHG | OXYGEN SATURATION: 99 %

## 2018-10-30 DIAGNOSIS — E78.3 HYPERCHYLOMICRONEMIA: ICD-10-CM

## 2018-10-30 DIAGNOSIS — Z83.3 FHX: DIABETES MELLITUS: ICD-10-CM

## 2018-10-30 DIAGNOSIS — F32.2 MAJOR DEPRESSIVE DISORDER, SEVERE (HCC): Primary | ICD-10-CM

## 2018-10-30 DIAGNOSIS — E03.9 ACQUIRED HYPOTHYROIDISM: ICD-10-CM

## 2018-10-30 DIAGNOSIS — R53.83 FATIGUE, UNSPECIFIED TYPE: ICD-10-CM

## 2018-10-30 DIAGNOSIS — S76.109A INJURY OF QUADRICEPS TENDON: ICD-10-CM

## 2018-10-30 PROCEDURE — G8484 FLU IMMUNIZE NO ADMIN: HCPCS | Performed by: INTERNAL MEDICINE

## 2018-10-30 PROCEDURE — G8427 DOCREV CUR MEDS BY ELIG CLIN: HCPCS | Performed by: INTERNAL MEDICINE

## 2018-10-30 PROCEDURE — 1036F TOBACCO NON-USER: CPT | Performed by: INTERNAL MEDICINE

## 2018-10-30 PROCEDURE — G8417 CALC BMI ABV UP PARAM F/U: HCPCS | Performed by: INTERNAL MEDICINE

## 2018-10-30 PROCEDURE — 99214 OFFICE O/P EST MOD 30 MIN: CPT | Performed by: INTERNAL MEDICINE

## 2018-10-30 RX ORDER — DIVALPROEX SODIUM 500 MG/1
500 TABLET, EXTENDED RELEASE ORAL DAILY
Status: ON HOLD | COMMUNITY
End: 2018-11-26 | Stop reason: HOSPADM

## 2018-10-30 ASSESSMENT — ENCOUNTER SYMPTOMS
EYE PAIN: 0
SHORTNESS OF BREATH: 0
BLOOD IN STOOL: 0
EYE REDNESS: 0
STRIDOR: 0
DIARRHEA: 0
NAUSEA: 0

## 2018-11-15 ENCOUNTER — TELEPHONE (OUTPATIENT)
Dept: ORTHOPEDIC SURGERY | Age: 48
End: 2018-11-15

## 2018-11-15 ENCOUNTER — OFFICE VISIT (OUTPATIENT)
Dept: ORTHOPEDIC SURGERY | Age: 48
End: 2018-11-15
Payer: MEDICARE

## 2018-11-15 ENCOUNTER — HOSPITAL ENCOUNTER (OUTPATIENT)
Dept: GENERAL RADIOLOGY | Age: 48
Discharge: HOME | End: 2018-11-17
Payer: MEDICARE

## 2018-11-15 ENCOUNTER — TELEPHONE (OUTPATIENT)
Dept: PRIMARY CARE CLINIC | Age: 48
End: 2018-11-15

## 2018-11-15 VITALS
HEIGHT: 68 IN | RESPIRATION RATE: 18 BRPM | SYSTOLIC BLOOD PRESSURE: 130 MMHG | DIASTOLIC BLOOD PRESSURE: 88 MMHG | WEIGHT: 198 LBS | HEART RATE: 86 BPM | BODY MASS INDEX: 30.01 KG/M2

## 2018-11-15 DIAGNOSIS — S76.112D RUPTURE OF LEFT QUADRICEPS MUSCLE, SUBSEQUENT ENCOUNTER: ICD-10-CM

## 2018-11-15 DIAGNOSIS — S76.112D RUPTURE OF LEFT QUADRICEPS TENDON, SUBSEQUENT ENCOUNTER: ICD-10-CM

## 2018-11-15 PROBLEM — S76.112A RUPTURE OF LEFT QUADRICEPS MUSCLE: Status: ACTIVE | Noted: 2018-11-15

## 2018-11-15 PROCEDURE — 99212 OFFICE O/P EST SF 10 MIN: CPT

## 2018-11-15 PROCEDURE — 99024 POSTOP FOLLOW-UP VISIT: CPT | Performed by: PHYSICIAN ASSISTANT

## 2018-11-15 PROCEDURE — 73560 X-RAY EXAM OF KNEE 1 OR 2: CPT

## 2018-11-20 ENCOUNTER — HOSPITAL ENCOUNTER (INPATIENT)
Age: 48
LOS: 6 days | Discharge: OTHER INSTITUTION | DRG: 751 | End: 2018-11-26
Attending: EMERGENCY MEDICINE | Admitting: PSYCHIATRY & NEUROLOGY
Payer: MEDICARE

## 2018-11-20 DIAGNOSIS — F31.4 BIPOLAR 1 DISORDER, DEPRESSED, SEVERE (HCC): Primary | ICD-10-CM

## 2018-11-20 DIAGNOSIS — R45.851 SUICIDAL IDEATION: ICD-10-CM

## 2018-11-20 PROBLEM — F23 ACUTE PSYCHOSIS (HCC): Status: ACTIVE | Noted: 2018-11-20

## 2018-11-20 PROBLEM — F32.A DEPRESSION WITH SUICIDAL IDEATION: Status: ACTIVE | Noted: 2018-11-20

## 2018-11-20 LAB
ACETAMINOPHEN LEVEL: <5 MCG/ML (ref 10–30)
ALBUMIN SERPL-MCNC: 4.1 G/DL (ref 3.5–5.2)
ALP BLD-CCNC: 64 U/L (ref 40–129)
ALT SERPL-CCNC: 15 U/L (ref 0–40)
AMPHETAMINE SCREEN, URINE: NOT DETECTED
ANION GAP SERPL CALCULATED.3IONS-SCNC: 19 MMOL/L (ref 7–16)
AST SERPL-CCNC: 15 U/L (ref 0–39)
BACTERIA: ABNORMAL /HPF
BARBITURATE SCREEN URINE: NOT DETECTED
BASOPHILS ABSOLUTE: 0.06 E9/L (ref 0–0.2)
BASOPHILS RELATIVE PERCENT: 0.5 % (ref 0–2)
BENZODIAZEPINE SCREEN, URINE: NOT DETECTED
BILIRUB SERPL-MCNC: 1.4 MG/DL (ref 0–1.2)
BILIRUBIN URINE: ABNORMAL
BLOOD, URINE: ABNORMAL
BUN BLDV-MCNC: 21 MG/DL (ref 6–20)
CALCIUM SERPL-MCNC: 9.1 MG/DL (ref 8.6–10.2)
CANNABINOID SCREEN URINE: NOT DETECTED
CASTS: ABNORMAL /LPF
CHLORIDE BLD-SCNC: 98 MMOL/L (ref 98–107)
CLARITY: CLEAR
CO2: 19 MMOL/L (ref 22–29)
COCAINE METABOLITE SCREEN URINE: NOT DETECTED
COLOR: ABNORMAL
CREAT SERPL-MCNC: 1 MG/DL (ref 0.7–1.2)
EKG ATRIAL RATE: 80 BPM
EKG P-R INTERVAL: 160 MS
EKG Q-T INTERVAL: 392 MS
EKG QRS DURATION: 96 MS
EKG QTC CALCULATION (BAZETT): 452 MS
EKG R AXIS: -169 DEGREES
EKG T AXIS: 142 DEGREES
EKG VENTRICULAR RATE: 80 BPM
EOSINOPHILS ABSOLUTE: 0.12 E9/L (ref 0.05–0.5)
EOSINOPHILS RELATIVE PERCENT: 1 % (ref 0–6)
ETHANOL: <10 MG/DL (ref 0–0.08)
GFR AFRICAN AMERICAN: >60
GFR NON-AFRICAN AMERICAN: >60 ML/MIN/1.73
GLUCOSE BLD-MCNC: 113 MG/DL (ref 74–99)
GLUCOSE URINE: NEGATIVE MG/DL
HCT VFR BLD CALC: 39.1 % (ref 37–54)
HEMOGLOBIN: 13.5 G/DL (ref 12.5–16.5)
IMMATURE GRANULOCYTES #: 0.07 E9/L
IMMATURE GRANULOCYTES %: 0.6 % (ref 0–5)
KETONES, URINE: 15 MG/DL
LEUKOCYTE ESTERASE, URINE: NEGATIVE
LYMPHOCYTES ABSOLUTE: 1.38 E9/L (ref 1.5–4)
LYMPHOCYTES RELATIVE PERCENT: 11.3 % (ref 20–42)
MCH RBC QN AUTO: 29.8 PG (ref 26–35)
MCHC RBC AUTO-ENTMCNC: 34.5 % (ref 32–34.5)
MCV RBC AUTO: 86.3 FL (ref 80–99.9)
METHADONE SCREEN, URINE: NOT DETECTED
MONOCYTES ABSOLUTE: 1.45 E9/L (ref 0.1–0.95)
MONOCYTES RELATIVE PERCENT: 11.9 % (ref 2–12)
NEUTROPHILS ABSOLUTE: 9.12 E9/L (ref 1.8–7.3)
NEUTROPHILS RELATIVE PERCENT: 74.7 % (ref 43–80)
NITRITE, URINE: NEGATIVE
OPIATE SCREEN URINE: NOT DETECTED
PDW BLD-RTO: 12.5 FL (ref 11.5–15)
PH UA: 6 (ref 5–9)
PHENCYCLIDINE SCREEN URINE: NOT DETECTED
PLATELET # BLD: 234 E9/L (ref 130–450)
PMV BLD AUTO: 10 FL (ref 7–12)
POTASSIUM SERPL-SCNC: 3.6 MMOL/L (ref 3.5–5)
PROPOXYPHENE SCREEN: NOT DETECTED
PROTEIN UA: ABNORMAL MG/DL
RBC # BLD: 4.53 E12/L (ref 3.8–5.8)
RBC UA: ABNORMAL /HPF (ref 0–2)
SALICYLATE, SERUM: <0.3 MG/DL (ref 0–30)
SODIUM BLD-SCNC: 136 MMOL/L (ref 132–146)
SPECIFIC GRAVITY UA: 1.02 (ref 1–1.03)
T4 TOTAL: 10.6 MCG/DL (ref 4.5–11.7)
TOTAL PROTEIN: 7 G/DL (ref 6.4–8.3)
TRICYCLIC ANTIDEPRESSANTS SCREEN SERUM: NEGATIVE NG/ML
TROPONIN: <0.01 NG/ML (ref 0–0.03)
TSH SERPL DL<=0.05 MIU/L-ACNC: 2.6 UIU/ML (ref 0.27–4.2)
UROBILINOGEN, URINE: 1 E.U./DL
VALPROIC ACID LEVEL: <3 MCG/ML (ref 50–100)
WBC # BLD: 12.2 E9/L (ref 4.5–11.5)
WBC UA: ABNORMAL /HPF (ref 0–5)

## 2018-11-20 PROCEDURE — 80307 DRUG TEST PRSMV CHEM ANLYZR: CPT

## 2018-11-20 PROCEDURE — 6370000000 HC RX 637 (ALT 250 FOR IP): Performed by: NURSE PRACTITIONER

## 2018-11-20 PROCEDURE — 84443 ASSAY THYROID STIM HORMONE: CPT

## 2018-11-20 PROCEDURE — 80053 COMPREHEN METABOLIC PANEL: CPT

## 2018-11-20 PROCEDURE — 1240000000 HC EMOTIONAL WELLNESS R&B

## 2018-11-20 PROCEDURE — 36415 COLL VENOUS BLD VENIPUNCTURE: CPT

## 2018-11-20 PROCEDURE — 85025 COMPLETE CBC W/AUTO DIFF WBC: CPT

## 2018-11-20 PROCEDURE — 99285 EMERGENCY DEPT VISIT HI MDM: CPT

## 2018-11-20 PROCEDURE — 81001 URINALYSIS AUTO W/SCOPE: CPT

## 2018-11-20 PROCEDURE — G0480 DRUG TEST DEF 1-7 CLASSES: HCPCS

## 2018-11-20 PROCEDURE — 84484 ASSAY OF TROPONIN QUANT: CPT

## 2018-11-20 PROCEDURE — 84436 ASSAY OF TOTAL THYROXINE: CPT

## 2018-11-20 PROCEDURE — 93005 ELECTROCARDIOGRAM TRACING: CPT | Performed by: EMERGENCY MEDICINE

## 2018-11-20 PROCEDURE — 80164 ASSAY DIPROPYLACETIC ACD TOT: CPT

## 2018-11-20 PROCEDURE — 99222 1ST HOSP IP/OBS MODERATE 55: CPT | Performed by: NURSE PRACTITIONER

## 2018-11-20 RX ORDER — TRAZODONE HYDROCHLORIDE 50 MG/1
50 TABLET ORAL NIGHTLY PRN
Status: DISCONTINUED | OUTPATIENT
Start: 2018-11-20 | End: 2018-11-26 | Stop reason: HOSPADM

## 2018-11-20 RX ORDER — HYDROXYZINE PAMOATE 50 MG/1
50 CAPSULE ORAL EVERY 6 HOURS PRN
Status: DISCONTINUED | OUTPATIENT
Start: 2018-11-20 | End: 2018-11-26 | Stop reason: HOSPADM

## 2018-11-20 RX ORDER — BENZTROPINE MESYLATE 1 MG/ML
2 INJECTION INTRAMUSCULAR; INTRAVENOUS 2 TIMES DAILY PRN
Status: DISCONTINUED | OUTPATIENT
Start: 2018-11-20 | End: 2018-11-26 | Stop reason: HOSPADM

## 2018-11-20 RX ORDER — CHOLECALCIFEROL (VITAMIN D3) 50 MCG
2000 TABLET ORAL DAILY
Status: DISCONTINUED | OUTPATIENT
Start: 2018-11-20 | End: 2018-11-26 | Stop reason: HOSPADM

## 2018-11-20 RX ORDER — OLANZAPINE 10 MG/1
10 TABLET ORAL
Status: ACTIVE | OUTPATIENT
Start: 2018-11-20 | End: 2018-11-20

## 2018-11-20 RX ORDER — OLANZAPINE 5 MG/1
10 TABLET, ORALLY DISINTEGRATING ORAL NIGHTLY
Status: DISCONTINUED | OUTPATIENT
Start: 2018-11-20 | End: 2018-11-22

## 2018-11-20 RX ORDER — BUPROPION HYDROCHLORIDE 150 MG/1
150 TABLET ORAL DAILY
Status: DISCONTINUED | OUTPATIENT
Start: 2018-11-20 | End: 2018-11-23

## 2018-11-20 RX ORDER — DIVALPROEX SODIUM 500 MG/1
500 TABLET, EXTENDED RELEASE ORAL DAILY
Status: DISCONTINUED | OUTPATIENT
Start: 2018-11-20 | End: 2018-11-21

## 2018-11-20 RX ORDER — HALOPERIDOL 5 MG/ML
10 INJECTION INTRAMUSCULAR EVERY 6 HOURS PRN
Status: DISCONTINUED | OUTPATIENT
Start: 2018-11-20 | End: 2018-11-26 | Stop reason: HOSPADM

## 2018-11-20 RX ORDER — ATORVASTATIN CALCIUM 20 MG/1
20 TABLET, FILM COATED ORAL DAILY
Status: DISCONTINUED | OUTPATIENT
Start: 2018-11-20 | End: 2018-11-26 | Stop reason: HOSPADM

## 2018-11-20 RX ORDER — MAGNESIUM HYDROXIDE/ALUMINUM HYDROXICE/SIMETHICONE 120; 1200; 1200 MG/30ML; MG/30ML; MG/30ML
30 SUSPENSION ORAL PRN
Status: DISCONTINUED | OUTPATIENT
Start: 2018-11-20 | End: 2018-11-26 | Stop reason: HOSPADM

## 2018-11-20 RX ORDER — LEVOTHYROXINE SODIUM 0.03 MG/1
25 TABLET ORAL DAILY
Status: DISCONTINUED | OUTPATIENT
Start: 2018-11-20 | End: 2018-11-26 | Stop reason: HOSPADM

## 2018-11-20 RX ORDER — ACETAMINOPHEN 325 MG/1
650 TABLET ORAL EVERY 4 HOURS PRN
Status: DISCONTINUED | OUTPATIENT
Start: 2018-11-20 | End: 2018-11-26 | Stop reason: HOSPADM

## 2018-11-20 RX ADMIN — ASPIRIN 325 MG: 325 TABLET, COATED ORAL at 10:49

## 2018-11-20 RX ADMIN — Medication 2000 UNITS: at 10:49

## 2018-11-20 RX ADMIN — ATORVASTATIN CALCIUM 20 MG: 20 TABLET, FILM COATED ORAL at 10:49

## 2018-11-20 RX ADMIN — ASPIRIN 325 MG: 325 TABLET, COATED ORAL at 16:39

## 2018-11-20 RX ADMIN — BUPROPION HYDROCHLORIDE 150 MG: 150 TABLET, FILM COATED, EXTENDED RELEASE ORAL at 10:49

## 2018-11-20 RX ADMIN — OLANZAPINE 10 MG: 5 TABLET, ORALLY DISINTEGRATING ORAL at 21:13

## 2018-11-20 ASSESSMENT — SLEEP AND FATIGUE QUESTIONNAIRES
DO YOU HAVE DIFFICULTY SLEEPING: YES
RESTFUL SLEEP: NO
DIFFICULTY FALLING ASLEEP: YES
RESTFUL SLEEP: NO
DO YOU USE A SLEEP AID: NO
DIFFICULTY FALLING ASLEEP: YES
SLEEP PATTERN: DIFFICULTY FALLING ASLEEP;DISTURBED/INTERRUPTED SLEEP;RESTLESSNESS
DIFFICULTY STAYING ASLEEP: YES
DIFFICULTY ARISING: YES
DO YOU USE A SLEEP AID: NO
SLEEP PATTERN: RESTLESSNESS;DIFFICULTY FALLING ASLEEP;DISTURBED/INTERRUPTED SLEEP
DO YOU HAVE DIFFICULTY SLEEPING: YES
DIFFICULTY STAYING ASLEEP: YES
AVERAGE NUMBER OF SLEEP HOURS: 1
AVERAGE NUMBER OF SLEEP HOURS: 1
DIFFICULTY ARISING: YES

## 2018-11-20 ASSESSMENT — ENCOUNTER SYMPTOMS
CHEST TIGHTNESS: 0
VOMITING: 0
BACK PAIN: 0
NAUSEA: 0
SORE THROAT: 0
ABDOMINAL PAIN: 0
SHORTNESS OF BREATH: 0

## 2018-11-20 ASSESSMENT — PATIENT HEALTH QUESTIONNAIRE - PHQ9
SUM OF ALL RESPONSES TO PHQ QUESTIONS 1-9: 27
SUM OF ALL RESPONSES TO PHQ QUESTIONS 1-9: 26
SUM OF ALL RESPONSES TO PHQ QUESTIONS 1-9: 27

## 2018-11-20 ASSESSMENT — PAIN SCALES - GENERAL: PAINLEVEL_OUTOF10: 0

## 2018-11-20 ASSESSMENT — LIFESTYLE VARIABLES
HISTORY_ALCOHOL_USE: NO
HISTORY_ALCOHOL_USE: NO

## 2018-11-20 NOTE — H&P
100%   BMI 30.11 kg/m²     ALLERGIES: Patient has no known allergies.             Physical Examination:    Head:  [x] Atraumatic:  [x] normocephalic  Skin and Mucosa       [] Moist [] Dry [] Pale [x] Normal   Neck: [x] Thyroid [] Palpable    [x] Not palpable []  venus distention [] adenopathy   Chest: [x] Clear [] Rhonchi  [] Wheezing   CV: [x] S1 [x] S2 [x] No murmer   Abdomen:  [x] Soft   [] Tender  [] Viceromegaly   Extremities:  [x] No Edema   [] Edema    Cranial Nerves Examination:    CN II: [x] Pupils are reactive to light [] Pupils are non reactive to light  CN III, IV, VI:[x] No eye deviation  [x] No diplopia or ptosis   CN V: [x] Facial Sensation is intact  [] Facial Sensation is not intact   CN IIIV:  [x] Hearing is normal to rubbing fingers   CN IX, X:  [x] Normal gag reflex and phonation   CN XI: [x] Shoulder shrug and neck rotation is normal  CNXII: [] Tongue is midline no deviation or atrophy       For further PE refer to ED note    MENTAL STATUS EXAM:       Mental Status Examination:    Cognition:      [x] Alert  [x] Awake  [x] Oriented  [x] Person  [x] Place [x] Time      [] drowsy  [] tired  [] lethargic  [] distractable  []     Attention/Concentration:   [x] Attentive  [] Distracted        Memory Recent and Remote: [x] Intact   [] Impaired [] Partially Impaired     Language: [] Able to recognize and name objects          [] Unable to recognize and name Objects    Fund of Knowledge:  [] Poor []  Fair  [x] Good    Speech: [x] Normal  [] Soft  [] Slow  [] Fast [] Pressured            [] Loud [] Dysarthria  [] Incoherent       Appearance: [] Well Groomed  [x] Casual Dressed  [] Unkept  [] Disheveled          [] Normal weight[] Thin  [] Overweight  [] Obese           Attitude: [] Positive  [] Hostile  [] Demanding  [x] Guarded  [] Defensive         [x] Cooperative  []  Uncooperative      Behavior:  [x] Normal Gait  [] Walks with Assistance  [] Angela Chair    [] Walks with Rendell Gravel  [] In Hospital Bed  []

## 2018-11-20 NOTE — ED NOTES
Unable to verify home meds. Pharmacy is closed. Pt accepted to [facility] by Dr Basilio Cabot.      Manuel Silva, DEMI  11/20/18 6032

## 2018-11-21 LAB
CHOLESTEROL, TOTAL: 160 MG/DL (ref 0–199)
HBA1C MFR BLD: 5.3 % (ref 4–5.6)
HDLC SERPL-MCNC: 46 MG/DL
LDL CHOLESTEROL CALCULATED: 101 MG/DL (ref 0–99)
TRIGL SERPL-MCNC: 65 MG/DL (ref 0–149)
VLDLC SERPL CALC-MCNC: 13 MG/DL

## 2018-11-21 PROCEDURE — 83036 HEMOGLOBIN GLYCOSYLATED A1C: CPT

## 2018-11-21 PROCEDURE — 99231 SBSQ HOSP IP/OBS SF/LOW 25: CPT | Performed by: NURSE PRACTITIONER

## 2018-11-21 PROCEDURE — 6370000000 HC RX 637 (ALT 250 FOR IP): Performed by: NURSE PRACTITIONER

## 2018-11-21 PROCEDURE — 6370000000 HC RX 637 (ALT 250 FOR IP): Performed by: PSYCHIATRY & NEUROLOGY

## 2018-11-21 PROCEDURE — 36415 COLL VENOUS BLD VENIPUNCTURE: CPT

## 2018-11-21 PROCEDURE — 80061 LIPID PANEL: CPT

## 2018-11-21 PROCEDURE — 1240000000 HC EMOTIONAL WELLNESS R&B

## 2018-11-21 RX ORDER — DIVALPROEX SODIUM 125 MG/1
250 CAPSULE, COATED PELLETS ORAL 2 TIMES DAILY
Status: DISCONTINUED | OUTPATIENT
Start: 2018-11-21 | End: 2018-11-22

## 2018-11-21 RX ADMIN — DIVALPROEX SODIUM 250 MG: 125 CAPSULE, COATED PELLETS ORAL at 21:10

## 2018-11-21 RX ADMIN — BUPROPION HYDROCHLORIDE 150 MG: 150 TABLET, FILM COATED, EXTENDED RELEASE ORAL at 10:54

## 2018-11-21 RX ADMIN — ATORVASTATIN CALCIUM 20 MG: 20 TABLET, FILM COATED ORAL at 10:50

## 2018-11-21 RX ADMIN — Medication 2000 UNITS: at 10:49

## 2018-11-21 RX ADMIN — ASPIRIN 325 MG: 325 TABLET, COATED ORAL at 10:50

## 2018-11-21 RX ADMIN — DIVALPROEX SODIUM 250 MG: 125 CAPSULE, COATED PELLETS ORAL at 10:49

## 2018-11-21 RX ADMIN — LEVOTHYROXINE SODIUM 25 MCG: 25 TABLET ORAL at 10:50

## 2018-11-21 RX ADMIN — OLANZAPINE 10 MG: 5 TABLET, ORALLY DISINTEGRATING ORAL at 21:10

## 2018-11-21 RX ADMIN — ASPIRIN 325 MG: 325 TABLET, COATED ORAL at 21:10

## 2018-11-21 ASSESSMENT — PAIN SCALES - GENERAL: PAINLEVEL_OUTOF10: 0

## 2018-11-22 PROCEDURE — 6370000000 HC RX 637 (ALT 250 FOR IP): Performed by: NURSE PRACTITIONER

## 2018-11-22 PROCEDURE — 99232 SBSQ HOSP IP/OBS MODERATE 35: CPT | Performed by: NURSE PRACTITIONER

## 2018-11-22 PROCEDURE — 1240000000 HC EMOTIONAL WELLNESS R&B

## 2018-11-22 PROCEDURE — 6370000000 HC RX 637 (ALT 250 FOR IP): Performed by: PSYCHIATRY & NEUROLOGY

## 2018-11-22 RX ORDER — DIVALPROEX SODIUM 125 MG/1
250 CAPSULE, COATED PELLETS ORAL 3 TIMES DAILY
Status: DISCONTINUED | OUTPATIENT
Start: 2018-11-22 | End: 2018-11-26 | Stop reason: HOSPADM

## 2018-11-22 RX ORDER — OLANZAPINE 5 MG/1
15 TABLET, ORALLY DISINTEGRATING ORAL NIGHTLY
Status: DISCONTINUED | OUTPATIENT
Start: 2018-11-22 | End: 2018-11-23

## 2018-11-22 RX ADMIN — DIVALPROEX SODIUM 250 MG: 125 CAPSULE, COATED PELLETS ORAL at 13:30

## 2018-11-22 RX ADMIN — DIVALPROEX SODIUM 250 MG: 125 CAPSULE, COATED PELLETS ORAL at 21:24

## 2018-11-22 RX ADMIN — LEVOTHYROXINE SODIUM 25 MCG: 25 TABLET ORAL at 09:04

## 2018-11-22 RX ADMIN — Medication 2000 UNITS: at 09:04

## 2018-11-22 RX ADMIN — BUPROPION HYDROCHLORIDE 150 MG: 150 TABLET, FILM COATED, EXTENDED RELEASE ORAL at 09:04

## 2018-11-22 RX ADMIN — OLANZAPINE 15 MG: 5 TABLET, ORALLY DISINTEGRATING ORAL at 21:23

## 2018-11-22 RX ADMIN — DIVALPROEX SODIUM 250 MG: 125 CAPSULE, COATED PELLETS ORAL at 09:04

## 2018-11-22 RX ADMIN — ASPIRIN 325 MG: 325 TABLET, COATED ORAL at 17:25

## 2018-11-22 RX ADMIN — TRAZODONE HYDROCHLORIDE 50 MG: 50 TABLET ORAL at 21:24

## 2018-11-22 RX ADMIN — ASPIRIN 325 MG: 325 TABLET, COATED ORAL at 09:03

## 2018-11-22 RX ADMIN — ATORVASTATIN CALCIUM 20 MG: 20 TABLET, FILM COATED ORAL at 09:03

## 2018-11-22 ASSESSMENT — PAIN SCALES - GENERAL: PAINLEVEL_OUTOF10: 0

## 2018-11-22 NOTE — PLAN OF CARE
Problem: Altered Mood, Depressive Behavior:  Goal: Able to verbalize acceptance of life and situations over which he or she has no control  Able to verbalize acceptance of life and situations over which he or she has no control   Outcome: Met This Shift      Comments: Pt denies suicidal ideations, homicidal ideations and hallucinations. Pt depressed. Pt feels hopeless and helpless. Pt appetite appropriate. Will continue to monitor.

## 2018-11-23 PROCEDURE — 6370000000 HC RX 637 (ALT 250 FOR IP): Performed by: PSYCHIATRY & NEUROLOGY

## 2018-11-23 PROCEDURE — 6370000000 HC RX 637 (ALT 250 FOR IP): Performed by: NURSE PRACTITIONER

## 2018-11-23 PROCEDURE — 1240000000 HC EMOTIONAL WELLNESS R&B

## 2018-11-23 PROCEDURE — 99232 SBSQ HOSP IP/OBS MODERATE 35: CPT | Performed by: NURSE PRACTITIONER

## 2018-11-23 RX ORDER — BUPROPION HYDROCHLORIDE 150 MG/1
300 TABLET ORAL DAILY
Status: DISCONTINUED | OUTPATIENT
Start: 2018-11-23 | End: 2018-11-26 | Stop reason: HOSPADM

## 2018-11-23 RX ORDER — DIAZEPAM 5 MG/1
5 TABLET ORAL 3 TIMES DAILY
Status: DISCONTINUED | OUTPATIENT
Start: 2018-11-23 | End: 2018-11-25

## 2018-11-23 RX ORDER — OLANZAPINE 5 MG/1
20 TABLET, ORALLY DISINTEGRATING ORAL NIGHTLY
Status: DISCONTINUED | OUTPATIENT
Start: 2018-11-23 | End: 2018-11-26 | Stop reason: HOSPADM

## 2018-11-23 RX ADMIN — ASPIRIN 325 MG: 325 TABLET, COATED ORAL at 10:02

## 2018-11-23 RX ADMIN — DIVALPROEX SODIUM 250 MG: 125 CAPSULE, COATED PELLETS ORAL at 14:49

## 2018-11-23 RX ADMIN — BUPROPION HYDROCHLORIDE 300 MG: 150 TABLET, FILM COATED, EXTENDED RELEASE ORAL at 09:57

## 2018-11-23 RX ADMIN — DIAZEPAM 5 MG: 5 TABLET ORAL at 14:49

## 2018-11-23 RX ADMIN — OLANZAPINE 20 MG: 5 TABLET, ORALLY DISINTEGRATING ORAL at 20:29

## 2018-11-23 RX ADMIN — DIVALPROEX SODIUM 250 MG: 125 CAPSULE, COATED PELLETS ORAL at 20:29

## 2018-11-23 RX ADMIN — ASPIRIN 325 MG: 325 TABLET, COATED ORAL at 16:56

## 2018-11-23 RX ADMIN — Medication 2000 UNITS: at 09:57

## 2018-11-23 RX ADMIN — DIVALPROEX SODIUM 250 MG: 125 CAPSULE, COATED PELLETS ORAL at 10:02

## 2018-11-23 RX ADMIN — DIAZEPAM 5 MG: 5 TABLET ORAL at 10:03

## 2018-11-23 RX ADMIN — ATORVASTATIN CALCIUM 20 MG: 20 TABLET, FILM COATED ORAL at 09:57

## 2018-11-23 RX ADMIN — DIAZEPAM 5 MG: 5 TABLET ORAL at 20:29

## 2018-11-23 RX ADMIN — LEVOTHYROXINE SODIUM 25 MCG: 25 TABLET ORAL at 06:47

## 2018-11-23 ASSESSMENT — PAIN SCALES - GENERAL
PAINLEVEL_OUTOF10: 0
PAINLEVEL_OUTOF10: 0

## 2018-11-23 NOTE — PLAN OF CARE
Problem: Altered Mood, Depressive Behavior:  Goal: Able to verbalize acceptance of life and situations over which he or she has no control  Able to verbalize acceptance of life and situations over which he or she has no control   Outcome: Ongoing    Goal: Able to verbalize and/or display a decrease in depressive symptoms  Able to verbalize and/or display a decrease in depressive symptoms   Outcome: Ongoing  Pt is stable and alert. Pt denies suicidal or homicidal ideations. Pt denies hallucinations. Pt is cooperative. Pt continues to be flat and quiet in his demeanor/presentation. Will follow and monitor. Reports goal to get better.

## 2018-11-24 PROCEDURE — 99231 SBSQ HOSP IP/OBS SF/LOW 25: CPT | Performed by: NURSE PRACTITIONER

## 2018-11-24 PROCEDURE — 1240000000 HC EMOTIONAL WELLNESS R&B

## 2018-11-24 PROCEDURE — 6370000000 HC RX 637 (ALT 250 FOR IP): Performed by: PSYCHIATRY & NEUROLOGY

## 2018-11-24 PROCEDURE — 6370000000 HC RX 637 (ALT 250 FOR IP): Performed by: NURSE PRACTITIONER

## 2018-11-24 RX ADMIN — LEVOTHYROXINE SODIUM 25 MCG: 25 TABLET ORAL at 06:42

## 2018-11-24 RX ADMIN — DIVALPROEX SODIUM 250 MG: 125 CAPSULE, COATED PELLETS ORAL at 14:16

## 2018-11-24 RX ADMIN — BUPROPION HYDROCHLORIDE 300 MG: 150 TABLET, FILM COATED, EXTENDED RELEASE ORAL at 08:57

## 2018-11-24 RX ADMIN — Medication 2000 UNITS: at 08:57

## 2018-11-24 RX ADMIN — DIAZEPAM 5 MG: 5 TABLET ORAL at 20:17

## 2018-11-24 RX ADMIN — OLANZAPINE 20 MG: 5 TABLET, ORALLY DISINTEGRATING ORAL at 20:17

## 2018-11-24 RX ADMIN — DIVALPROEX SODIUM 250 MG: 125 CAPSULE, COATED PELLETS ORAL at 20:17

## 2018-11-24 RX ADMIN — DIAZEPAM 5 MG: 5 TABLET ORAL at 14:16

## 2018-11-24 RX ADMIN — ASPIRIN 325 MG: 325 TABLET, COATED ORAL at 08:57

## 2018-11-24 RX ADMIN — DIAZEPAM 5 MG: 5 TABLET ORAL at 08:57

## 2018-11-24 RX ADMIN — DIVALPROEX SODIUM 250 MG: 125 CAPSULE, COATED PELLETS ORAL at 08:57

## 2018-11-24 RX ADMIN — ATORVASTATIN CALCIUM 20 MG: 20 TABLET, FILM COATED ORAL at 08:57

## 2018-11-24 RX ADMIN — ASPIRIN 325 MG: 325 TABLET, COATED ORAL at 16:35

## 2018-11-24 ASSESSMENT — PAIN SCALES - GENERAL
PAINLEVEL_OUTOF10: 0
PAINLEVEL_OUTOF10: 0

## 2018-11-25 PROCEDURE — 6370000000 HC RX 637 (ALT 250 FOR IP): Performed by: PSYCHIATRY & NEUROLOGY

## 2018-11-25 PROCEDURE — 1240000000 HC EMOTIONAL WELLNESS R&B

## 2018-11-25 PROCEDURE — 99231 SBSQ HOSP IP/OBS SF/LOW 25: CPT | Performed by: NURSE PRACTITIONER

## 2018-11-25 PROCEDURE — 6370000000 HC RX 637 (ALT 250 FOR IP): Performed by: NURSE PRACTITIONER

## 2018-11-25 RX ORDER — DIAZEPAM 5 MG/1
5 TABLET ORAL NIGHTLY
Status: DISCONTINUED | OUTPATIENT
Start: 2018-11-25 | End: 2018-11-26 | Stop reason: HOSPADM

## 2018-11-25 RX ADMIN — DIVALPROEX SODIUM 250 MG: 125 CAPSULE, COATED PELLETS ORAL at 14:38

## 2018-11-25 RX ADMIN — DIAZEPAM 5 MG: 5 TABLET ORAL at 08:24

## 2018-11-25 RX ADMIN — ASPIRIN 325 MG: 325 TABLET, COATED ORAL at 08:25

## 2018-11-25 RX ADMIN — LEVOTHYROXINE SODIUM 25 MCG: 25 TABLET ORAL at 06:49

## 2018-11-25 RX ADMIN — DIAZEPAM 5 MG: 5 TABLET ORAL at 20:24

## 2018-11-25 RX ADMIN — BUPROPION HYDROCHLORIDE 300 MG: 150 TABLET, FILM COATED, EXTENDED RELEASE ORAL at 08:24

## 2018-11-25 RX ADMIN — ATORVASTATIN CALCIUM 20 MG: 20 TABLET, FILM COATED ORAL at 08:24

## 2018-11-25 RX ADMIN — Medication 2000 UNITS: at 08:24

## 2018-11-25 RX ADMIN — DIVALPROEX SODIUM 250 MG: 125 CAPSULE, COATED PELLETS ORAL at 08:24

## 2018-11-25 RX ADMIN — OLANZAPINE 20 MG: 5 TABLET, ORALLY DISINTEGRATING ORAL at 20:24

## 2018-11-25 RX ADMIN — ASPIRIN 325 MG: 325 TABLET, COATED ORAL at 16:24

## 2018-11-25 RX ADMIN — DIVALPROEX SODIUM 250 MG: 125 CAPSULE, COATED PELLETS ORAL at 20:24

## 2018-11-25 ASSESSMENT — PAIN SCALES - GENERAL
PAINLEVEL_OUTOF10: 0
PAINLEVEL_OUTOF10: 0

## 2018-11-26 VITALS
HEIGHT: 68 IN | RESPIRATION RATE: 12 BRPM | HEART RATE: 96 BPM | TEMPERATURE: 97.5 F | WEIGHT: 198 LBS | OXYGEN SATURATION: 100 % | DIASTOLIC BLOOD PRESSURE: 81 MMHG | BODY MASS INDEX: 30.01 KG/M2 | SYSTOLIC BLOOD PRESSURE: 121 MMHG

## 2018-11-26 PROCEDURE — 6370000000 HC RX 637 (ALT 250 FOR IP): Performed by: NURSE PRACTITIONER

## 2018-11-26 PROCEDURE — 99238 HOSP IP/OBS DSCHRG MGMT 30/<: CPT | Performed by: PSYCHIATRY & NEUROLOGY

## 2018-11-26 PROCEDURE — 6370000000 HC RX 637 (ALT 250 FOR IP): Performed by: PSYCHIATRY & NEUROLOGY

## 2018-11-26 RX ORDER — DIVALPROEX SODIUM 125 MG/1
250 CAPSULE, COATED PELLETS ORAL 3 TIMES DAILY
Qty: 60 CAPSULE | Refills: 0 | Status: SHIPPED | OUTPATIENT
Start: 2018-11-26

## 2018-11-26 RX ORDER — DIAZEPAM 5 MG/1
5 TABLET ORAL NIGHTLY
Qty: 10 TABLET | Refills: 0 | Status: SHIPPED | OUTPATIENT
Start: 2018-11-26 | End: 2018-12-06

## 2018-11-26 RX ORDER — BUPROPION HYDROCHLORIDE 300 MG/1
300 TABLET ORAL DAILY
Qty: 30 TABLET | Refills: 0 | Status: SHIPPED | OUTPATIENT
Start: 2018-11-27

## 2018-11-26 RX ORDER — TRAZODONE HYDROCHLORIDE 50 MG/1
50 TABLET ORAL NIGHTLY PRN
Qty: 30 TABLET | Refills: 0 | Status: SHIPPED | OUTPATIENT
Start: 2018-11-26

## 2018-11-26 RX ORDER — OLANZAPINE 20 MG/1
20 TABLET, ORALLY DISINTEGRATING ORAL NIGHTLY
Qty: 30 TABLET | Refills: 0 | Status: SHIPPED | OUTPATIENT
Start: 2018-11-26

## 2018-11-26 RX ADMIN — ATORVASTATIN CALCIUM 20 MG: 20 TABLET, FILM COATED ORAL at 08:47

## 2018-11-26 RX ADMIN — DIVALPROEX SODIUM 250 MG: 125 CAPSULE, COATED PELLETS ORAL at 08:47

## 2018-11-26 RX ADMIN — Medication 2000 UNITS: at 08:47

## 2018-11-26 RX ADMIN — LEVOTHYROXINE SODIUM 25 MCG: 25 TABLET ORAL at 06:52

## 2018-11-26 RX ADMIN — DIVALPROEX SODIUM 250 MG: 125 CAPSULE, COATED PELLETS ORAL at 13:40

## 2018-11-26 RX ADMIN — BUPROPION HYDROCHLORIDE 300 MG: 150 TABLET, FILM COATED, EXTENDED RELEASE ORAL at 08:47

## 2018-11-26 RX ADMIN — ASPIRIN 325 MG: 325 TABLET, COATED ORAL at 08:47

## 2018-11-26 ASSESSMENT — PAIN SCALES - GENERAL: PAINLEVEL_OUTOF10: 0

## 2018-11-26 NOTE — PROGRESS NOTES
Attended both wellness and wrap up groups
Date: 11/23/2018  Start Time: 1:00 PM  End Time:  1:45 PM  Number of Participants: 9     Type of Group: Cognitive Skills     Patient's Goal:  Engage in relaxation exercise     Notes:  Pt engaged in progressive muscle relaxation exercise and then processed experience with group.       Status After Intervention:  Improved     Participation Level:  Active Listener, Interactive and Minimal     Participation Quality: Appropriate, Attentive and Sharing        Speech:  normal        Thought Process/Content: Logical        Affective Functioning: Congruent        Mood: euthymic        Level of consciousness:  Alert, Oriented x4 and Attentive        Response to Learning: Able to verbalize current knowledge/experience and Able to verbalize/acknowledge new learning        Endings: None Reported     Modes of Intervention: Education, Support, Socialization and Activity        Discipline Responsible: /Counselor
Group Therapy Note    Date: 11/21/2018  Start Time: 3:45  End Time:  4:30  Number of Participants: 11    Type of Group: Recreational    Wellness Binder Information  Module Name:  Music Therapy   Session Number:  n/a    Patient's Goal:  Patient will identify the benefits of music and share favorite song. Notes:  Patient was interactive during group sharing benefits of music and shared favorite song with group. Status After Intervention:  Improved    Participation Level:  Active Listener and Interactive    Participation Quality: Appropriate, Attentive, Sharing and Supportive      Speech:  normal      Thought Process/Content: Logical      Affective Functioning: Congruent      Mood: euthymic      Level of consciousness:  Alert, Oriented x4 and Attentive      Response to Learning: Able to verbalize current knowledge/experience, Able to verbalize/acknowledge new learning, Able to retain information, Capable of insight, Able to change behavior and Progressing to goal      Endings: None Reported    Modes of Intervention: Education, Support, Socialization, Exploration, Clarifying, Problem-solving and Activity      Discipline Responsible: Psychoeducational Specialist      Signature:  Yao Smith
Group Therapy Note    Date: 11/23/2018  Start Time: 11:00 AM  End Time:  11:50 AM  Number of Participants: 9    Type of Group: Psychotherapy    Patient's Goal:  To increase social interaction and relationships with others.     Notes:  Pt was active minimally and verbal during group and was able to relate to others. Status After Intervention:  Unchanged    Participation Level:  Active Listener and Minimal    Participation Quality: Appropriate, Attentive and Sharing      Speech:  normal      Thought Process/Content: Logical      Affective Functioning: Constricted/Restricted      Mood: depressed      Level of consciousness:  Alert, Oriented x4 and Attentive      Response to Learning: Able to verbalize current knowledge/experience      Endings: None Reported    Modes of Intervention: Education, Support, Socialization and Activity      Discipline Responsible: /Counselor      Signature:  Hong Anthony MSW, LSW
Group Therapy Note     Date: 11/23/2018  Start Time: 200  End Time:  060  Number of Participants: 10     Type of Group: Recovery     Wellness Binder Information  Module Name:  n/a  Session Number:  n/a     Patient's Goal:  Identification of the three types of boundaries and ways to improve boundaries     Notes:  Pt was active and verbal during group and was able to relate to others.     Status After Intervention:  Improved     Participation Level:  Active Listener and Interactive     Participation Quality: Appropriate, Attentive and Sharing        Speech:  hesitant        Thought Process/Content: Logical      Affective Functioning: flat        Mood: anxious        Level of consciousness:  Alert, Oriented x4 and Attentive        Response to Learning: Able to verbalize current knowledge/experience and Able to retain information        Endings: None Reported     Modes of Intervention: Support, Socialization and Exploration        Discipline Responsible: /Counselor        Signature:  GEORGE Aggarwal, RONAL
Pt attended wrap-up group and feels goal was accomplished for today.
method)  · Usual Body Wt:  (unable to obtain exact UBW)  ·  No actual weights available in EMR history ; EMR shows past weights of 215# no method on 10/1/15 and 221# no method on 4/24/18  · Ideal Body Wt: 154 lb (69.9 kg), % Ideal Body 129%  · Adjusted Body Wt: 165 lb (74.8 kg), BMI Classification: BMI 30.0 - 34.9 Obese Class I    Nutrition Interventions:   Continue current diet, Continue current ONS  Continued Inpatient Monitoring, Coordination of Care    Nutrition Evaluation:   · Evaluation: Goals set   · Goals: Patient will consume >75% of most meals and supplements served     · Monitoring: Meal Intake, Supplement Intake, Diet Tolerance, Skin Integrity, I&O, Mental Status/Confusion, Weight, Pertinent Labs, Chewing/Swallowing, Monitor Hemodynamic Status, Monitor Bowel Function      Electronically signed by Darian Burgess RD, LD on 11/21/18 at 10:53 AM    Contact Number: [phone]
muscle pain []  joint pain  Neurologic Symptoms: []  headache []  dizziness  Hematolymphoid Symptoms: [] Adenopathy [] Bruises   [] Schimosis       Psychiatric Review of systems  Delusions:  [] Denies [] Endorses   Withdrawals:  [] Denies [] Endorses    Hallucinations: [] Denies [] Endorses    Extra Pyramidal Symptoms: [] Denies [] Endorses      /75   Pulse 80   Temp 97.9 °F (36.6 °C) (Oral)   Resp 14   Ht 5' 8\" (1.727 m)   Wt 198 lb (89.8 kg)   SpO2 100%   BMI 30.11 kg/m²     Mental Status Examination:    Cognition:      [x] Alert  [x] Awake  [x] Oriented  [x] Person  [x] Place [x] Time      [] drowsy  [] tired  [] lethargic  [] distractable  [] Other     Attention/Concentration:   [x] Attentive  [] Distracted        Memory Recent and Remote: [x] Intact   [] Impaired [] Partially Impaired     Language: [] Able to recognize and name objects          [] Unable to recognize and name Objects    Fund of Knowledge:  [] Poor []  Fair  [x] Good    Speech: [x] Normal  [] Soft  [] Slow  [] Fast [] Pressured            [] Loud [] Dysarthria  [] Incoherent    Appearance: [] Well Groomed  [x] Casual Dressed  [] Unkept  [] Disheveled          [] Normal weight[] Thin  [] Overweight  [] Obese           Attitude: [] Positive  [] Hostile  [] Demanding  [x] Guarded  [] Defensive         [x] Cooperative  []  Uncooperative      Behavior:  [x] Normal Gait  [] Walks with Assistance  [] Angela Chair    [] Walks with Fneg Ken  [] In Hospital Bed  [] Sitting in Chair    Muscle-Skeletal:  [x] Normal Muscle Tone [] Muscle Atrophy       [] Abnormal Muscle Movement     Eye Contact:  [] Good eye contact  [] Intermittent Eye Contact  [x] Poor Eye Contact     Mood: [x] Depressed  [] Anxious  [] Irritated  [] Euthymic   [] Angry [] Restless    Affect:  [x] Congruent  [] Incongruent  [] Labile  [] Constricted  [x] Flat  [] Bizarre     Thought Process and Association:  [] Logical [] Illogical       [] Linear and Goal Directed  [x] Tangential
pain  Neurologic Symptoms: []  headache []  dizziness  Hematolymphoid Symptoms: [] Adenopathy [] Bruises   [] Schimosis       Psychiatric Review of systems  Delusions:  [] Denies [] Endorses   Withdrawals:  [] Denies [] Endorses    Hallucinations: [] Denies [] Endorses    Extra Pyramidal Symptoms: [] Denies [] Endorses      /75   Pulse 80   Temp 97.9 °F (36.6 °C) (Oral)   Resp 14   Ht 5' 8\" (1.727 m)   Wt 198 lb (89.8 kg)   SpO2 100%   BMI 30.11 kg/m²     Mental Status Examination:    Cognition:      [x] Alert  [x] Awake  [x] Oriented  [x] Person  [x] Place [x] Time      [] drowsy  [] tired  [] lethargic  [] distractable  [] Other     Attention/Concentration:   [x] Attentive  [] Distracted        Memory Recent and Remote: [x] Intact   [] Impaired [] Partially Impaired     Language: [] Able to recognize and name objects          [] Unable to recognize and name Objects    Fund of Knowledge:  [] Poor []  Fair  [x] Good    Speech: [x] Normal  [] Soft  [] Slow  [] Fast [] Pressured            [] Loud [] Dysarthria  [] Incoherent    Appearance: [] Well Groomed  [x] Casual Dressed  [] Unkept  [] Disheveled          [] Normal weight[] Thin  [] Overweight  [] Obese           Attitude: [] Positive  [] Hostile  [] Demanding  [x] Guarded  [] Defensive         [x] Cooperative  []  Uncooperative      Behavior:  [x] Normal Gait  [] Walks with Assistance  [] Angela Chair    [] Walks with Storm Tamela  [] In Hospital Bed  [] Sitting in Chair    Muscle-Skeletal:  [x] Normal Muscle Tone [] Muscle Atrophy       [] Abnormal Muscle Movement     Eye Contact:  [] Good eye contact  [] Intermittent Eye Contact  [x] Poor Eye Contact     Mood: [x] Depressed  [] Anxious  [] Irritated  [] Euthymic   [] Angry [] Restless    Affect:  [] Congruent  [] Incongruent  [] Labile  [x] Constricted  [x] Flat  [] Bizarre     Thought Process and Association:  [] Logical [] Illogical       [] Linear and Goal Directed  [x] Tangential  [x] Circumstantial

## 2018-11-26 NOTE — DISCHARGE SUMMARY
disorder), single episode F32.9    Major depressive disorder, severe ([address] F32.2    Injury of quadriceps tendon S76.109A    FHx: diabetes mellitus Z83.3    Rupture of left quadriceps muscle S76.112A    Depression with suicidal ideation F32.9, R45.851    Bipolar 1 disorder, depressed, severe ([address] F31.4       Education and Follow-up:  Counseled:  [x] Patient     [] Family    [] Guardian      SignedMarcial Bryant   11/26/2018   10:31 AM

## 2018-12-03 ENCOUNTER — TELEPHONE (OUTPATIENT)
Dept: PRIMARY CARE CLINIC | Age: 48
End: 2018-12-03

## 2020-11-03 PROBLEM — E03.9 ACQUIRED HYPOTHYROIDISM: Status: RESOLVED | Noted: 2018-04-22 | Resolved: 2020-11-03

## 2023-06-30 NOTE — CARE COORDINATION
Discussed d/c plan with pt in treatment team. Pt agreeable to step down unit.  Sent referral to crisis unit via fax <-- Click to add NO pertinent Family History

## (undated) DEVICE — TOWEL,OR,DSP,ST,BLUE,DLX,10/PK,8PK/CS: Brand: MEDLINE

## (undated) DEVICE — SET ORTHO STD STORTSTD2

## (undated) DEVICE — 1.5L THIN WALL CAN: Brand: CRD

## (undated) DEVICE — PLATE ES AD W 9FT CRD 2

## (undated) DEVICE — SET ORTHO STD STORTSTD1

## (undated) DEVICE — SOLUTION IV IRRIG WATER 1000ML POUR BRL 2F7114

## (undated) DEVICE — DRILL SYSTEM 7

## (undated) DEVICE — CHLORAPREP 26ML ORANGE

## (undated) DEVICE — Z DISCONTINUED PER MEDLINE USE 2741943 DRESSING AQUACEL 10 IN ALG W9XL25CM SIL CVR WTRPRF VIR BACT BARR ANTIMIC

## (undated) DEVICE — PADDING,UNDERCAST,COTTON, 3X4YD STERILE: Brand: MEDLINE

## (undated) DEVICE — DRIP REDUCTION MANIFOLD

## (undated) DEVICE — Z CONVERTED USE 2275207 CLOTH PREP W7.5XL7.5IN 2% CHG SKIN ALC AND RNS FREE

## (undated) DEVICE — BANDAGE COMPR W4INXL10YD WHITE/BEIGE E MTRX HK LOOP CLSR

## (undated) DEVICE — HAND SET

## (undated) DEVICE — GOWN,BREATHABLE SLV,AURORA,XLG,STRL: Brand: MEDLINE

## (undated) DEVICE — DRAPE C ARM W41XL74IN UNIV MOB W RUBBERBAND CLP

## (undated) DEVICE — SURGICAL PROCEDURE PACK BASIC

## (undated) DEVICE — SET LAMBOTTE

## (undated) DEVICE — INTENDED FOR TISSUE SEPARATION, AND OTHER PROCEDURES THAT REQUIRE A SHARP SURGICAL BLADE TO PUNCTURE OR CUT.: Brand: BARD-PARKER ® STAINLESS STEEL BLADES

## (undated) DEVICE — PADDING,UNDERCAST,COTTON, 4"X4YD STERILE: Brand: MEDLINE

## (undated) DEVICE — GLOVE ORANGE PI 8   MSG9080

## (undated) DEVICE — GOWN,AURORA,BRTHSLV,2XL,18/CS: Brand: MEDLINE

## (undated) DEVICE — 3M™ STERI-DRAPE™ U-DRAPE 1015: Brand: STERI-DRAPE™

## (undated) DEVICE — CONTROL SYRINGE LUER-LOCK TIP: Brand: MONOJECT

## (undated) DEVICE — BANDAGE COBAN 4 IN COMPR W4INXL5YD FOAM COHESIVE QUIK STK SELF ADH SFT

## (undated) DEVICE — READY WET SKIN SCRUB TRAY-LF: Brand: MEDLINE INDUSTRIES, INC.

## (undated) DEVICE — Z DISCONTINUED USE 2275686 GLOVE SURG SZ 8 L12IN FNGR THK13MIL WHT ISOLEX POLYISOPRENE

## (undated) DEVICE — TOTAL KNEE PK

## (undated) DEVICE — DISCONTINUED NO SUB IDED CSBANDAGE ESMARK 12FTX3IN ST COMPRSS LF

## (undated) DEVICE — SOLUTION IV IRRIG POUR BRL 0.9% SODIUM CHL 2F7124